# Patient Record
Sex: MALE | Race: BLACK OR AFRICAN AMERICAN | Employment: OTHER | URBAN - METROPOLITAN AREA
[De-identification: names, ages, dates, MRNs, and addresses within clinical notes are randomized per-mention and may not be internally consistent; named-entity substitution may affect disease eponyms.]

---

## 2018-07-02 ENCOUNTER — HOSPITAL ENCOUNTER (OUTPATIENT)
Dept: NUCLEAR MEDICINE | Age: 58
Discharge: HOME | End: 2018-07-04
Payer: COMMERCIAL

## 2018-07-02 DIAGNOSIS — E04.1 THYROID NODULE: ICD-10-CM

## 2018-07-02 PROCEDURE — A9512 TC99M PERTECHNETATE: HCPCS | Performed by: RADIOLOGY

## 2018-07-02 PROCEDURE — 78014 THYROID IMAGING W/BLOOD FLOW: CPT

## 2018-07-02 PROCEDURE — 3430000000 HC RX DIAGNOSTIC RADIOPHARMACEUTICAL: Performed by: RADIOLOGY

## 2018-07-02 RX ADMIN — Medication 20 MICRO CURIE: at 09:15

## 2018-07-03 PROCEDURE — 3430000000 HC RX DIAGNOSTIC RADIOPHARMACEUTICAL: Performed by: RADIOLOGY

## 2018-07-03 PROCEDURE — A9512 TC99M PERTECHNETATE: HCPCS | Performed by: RADIOLOGY

## 2018-07-03 RX ADMIN — Medication 8 MILLICURIE: at 08:28

## 2022-01-04 LAB — PROSTATE SPECIFIC ANTIGEN: 4.16 NG/ML (ref 0–4)

## 2022-03-22 ENCOUNTER — HOSPITAL ENCOUNTER (EMERGENCY)
Age: 62
Discharge: HOME | End: 2022-03-22
Attending: EMERGENCY MEDICINE
Payer: COMMERCIAL

## 2022-03-22 VITALS
HEART RATE: 66 BPM | WEIGHT: 145 LBS | HEIGHT: 66 IN | SYSTOLIC BLOOD PRESSURE: 130 MMHG | RESPIRATION RATE: 20 BRPM | OXYGEN SATURATION: 97 % | TEMPERATURE: 97.7 F | BODY MASS INDEX: 23.3 KG/M2 | DIASTOLIC BLOOD PRESSURE: 76 MMHG

## 2022-03-22 DIAGNOSIS — R33.9 URINARY RETENTION: Primary | ICD-10-CM

## 2022-03-22 DIAGNOSIS — E87.6 HYPOKALEMIA: ICD-10-CM

## 2022-03-22 LAB
ANION GAP SERPL CALCULATED.3IONS-SCNC: 14 MMOL/L (ref 7–16)
BACTERIA: ABNORMAL /HPF
BASOPHILS ABSOLUTE: 0.02 E9/L (ref 0–0.2)
BASOPHILS RELATIVE PERCENT: 0.3 % (ref 0–2)
BILIRUBIN URINE: NEGATIVE
BLOOD, URINE: ABNORMAL
BUN BLDV-MCNC: 10 MG/DL (ref 6–23)
CALCIUM SERPL-MCNC: 9.2 MG/DL (ref 8.6–10.2)
CHLORIDE BLD-SCNC: 102 MMOL/L (ref 98–107)
CLARITY: CLEAR
CO2: 22 MMOL/L (ref 22–29)
COLOR: YELLOW
CREAT SERPL-MCNC: 1 MG/DL (ref 0.7–1.2)
EOSINOPHILS ABSOLUTE: 0 E9/L (ref 0.05–0.5)
EOSINOPHILS RELATIVE PERCENT: 0 % (ref 0–6)
GFR AFRICAN AMERICAN: >60
GFR NON-AFRICAN AMERICAN: >60 ML/MIN/1.73
GLUCOSE BLD-MCNC: 154 MG/DL (ref 74–99)
GLUCOSE URINE: NEGATIVE MG/DL
HCT VFR BLD CALC: 42.6 % (ref 37–54)
HEMOGLOBIN: 15.2 G/DL (ref 12.5–16.5)
IMMATURE GRANULOCYTES #: 0.02 E9/L
IMMATURE GRANULOCYTES %: 0.3 % (ref 0–5)
KETONES, URINE: 15 MG/DL
LEUKOCYTE ESTERASE, URINE: NEGATIVE
LYMPHOCYTES ABSOLUTE: 0.57 E9/L (ref 1.5–4)
LYMPHOCYTES RELATIVE PERCENT: 8.6 % (ref 20–42)
MAGNESIUM: 1.8 MG/DL (ref 1.6–2.6)
MCH RBC QN AUTO: 30.1 PG (ref 26–35)
MCHC RBC AUTO-ENTMCNC: 35.7 % (ref 32–34.5)
MCV RBC AUTO: 84.4 FL (ref 80–99.9)
MONOCYTES ABSOLUTE: 0.19 E9/L (ref 0.1–0.95)
MONOCYTES RELATIVE PERCENT: 2.9 % (ref 2–12)
NEUTROPHILS ABSOLUTE: 5.79 E9/L (ref 1.8–7.3)
NEUTROPHILS RELATIVE PERCENT: 87.9 % (ref 43–80)
NITRITE, URINE: NEGATIVE
PDW BLD-RTO: 12.8 FL (ref 11.5–15)
PH UA: 7.5 (ref 5–9)
PLATELET # BLD: 270 E9/L (ref 130–450)
PMV BLD AUTO: 9.7 FL (ref 7–12)
POTASSIUM REFLEX MAGNESIUM: 3.4 MMOL/L (ref 3.5–5)
PROTEIN UA: NEGATIVE MG/DL
RBC # BLD: 5.05 E12/L (ref 3.8–5.8)
RBC # BLD: NORMAL 10*6/UL
RBC UA: ABNORMAL /HPF (ref 0–2)
SODIUM BLD-SCNC: 138 MMOL/L (ref 132–146)
SPECIFIC GRAVITY UA: 1.01 (ref 1–1.03)
UROBILINOGEN, URINE: 0.2 E.U./DL
WBC # BLD: 6.6 E9/L (ref 4.5–11.5)
WBC UA: ABNORMAL /HPF (ref 0–5)

## 2022-03-22 PROCEDURE — 80048 BASIC METABOLIC PNL TOTAL CA: CPT

## 2022-03-22 PROCEDURE — 36415 COLL VENOUS BLD VENIPUNCTURE: CPT

## 2022-03-22 PROCEDURE — 51702 INSERT TEMP BLADDER CATH: CPT

## 2022-03-22 PROCEDURE — 51798 US URINE CAPACITY MEASURE: CPT

## 2022-03-22 PROCEDURE — 81001 URINALYSIS AUTO W/SCOPE: CPT

## 2022-03-22 PROCEDURE — 83735 ASSAY OF MAGNESIUM: CPT

## 2022-03-22 PROCEDURE — 85025 COMPLETE CBC W/AUTO DIFF WBC: CPT

## 2022-03-22 PROCEDURE — 6370000000 HC RX 637 (ALT 250 FOR IP): Performed by: EMERGENCY MEDICINE

## 2022-03-22 PROCEDURE — 99284 EMERGENCY DEPT VISIT MOD MDM: CPT

## 2022-03-22 RX ORDER — ONDANSETRON 4 MG/1
4 TABLET, ORALLY DISINTEGRATING ORAL ONCE
Status: COMPLETED | OUTPATIENT
Start: 2022-03-22 | End: 2022-03-22

## 2022-03-22 RX ORDER — LEVOTHYROXINE SODIUM 112 UG/1
88 TABLET ORAL DAILY
COMMUNITY

## 2022-03-22 RX ORDER — POTASSIUM CHLORIDE 20 MEQ/1
40 TABLET, EXTENDED RELEASE ORAL ONCE
Status: COMPLETED | OUTPATIENT
Start: 2022-03-22 | End: 2022-03-22

## 2022-03-22 RX ADMIN — ONDANSETRON 4 MG: 4 TABLET, ORALLY DISINTEGRATING ORAL at 20:44

## 2022-03-22 RX ADMIN — POTASSIUM CHLORIDE 40 MEQ: 20 TABLET, EXTENDED RELEASE ORAL at 21:22

## 2022-03-22 ASSESSMENT — PAIN SCALES - GENERAL: PAINLEVEL_OUTOF10: 8

## 2022-03-22 ASSESSMENT — PAIN DESCRIPTION - PAIN TYPE: TYPE: ACUTE PAIN

## 2022-03-22 ASSESSMENT — PAIN DESCRIPTION - DESCRIPTORS: DESCRIPTORS: PRESSURE

## 2022-03-22 ASSESSMENT — PAIN DESCRIPTION - LOCATION: LOCATION: ABDOMEN

## 2022-03-22 NOTE — ED NOTES
1949  Patient unable to void, 16fg schwartz catheter placed without difficulty, 700ml yellow urine immediately returned.      Parag Kohli RN  03/1960

## 2022-03-23 NOTE — ED PROVIDER NOTES
7.0 - 12.0 fL    Neutrophils % 87.9 (H) 43.0 - 80.0 %    Immature Granulocytes % 0.3 0.0 - 5.0 %    Lymphocytes % 8.6 (L) 20.0 - 42.0 %    Monocytes % 2.9 2.0 - 12.0 %    Eosinophils % 0.0 0.0 - 6.0 %    Basophils % 0.3 0.0 - 2.0 %    Neutrophils Absolute 5.79 1.80 - 7.30 E9/L    Immature Granulocytes # 0.02 E9/L    Lymphocytes Absolute 0.57 (L) 1.50 - 4.00 E9/L    Monocytes Absolute 0.19 0.10 - 0.95 E9/L    Eosinophils Absolute 0.00 (L) 0.05 - 0.50 E9/L    Basophils Absolute 0.02 0.00 - 0.20 E9/L    RBC Morphology Normal    Basic Metabolic Panel w/ Reflex to MG   Result Value Ref Range    Sodium 138 132 - 146 mmol/L    Potassium reflex Magnesium 3.4 (L) 3.5 - 5.0 mmol/L    Chloride 102 98 - 107 mmol/L    CO2 22 22 - 29 mmol/L    Anion Gap 14 7 - 16 mmol/L    Glucose 154 (H) 74 - 99 mg/dL    BUN 10 6 - 23 mg/dL    CREATININE 1.0 0.7 - 1.2 mg/dL    GFR Non-African American >60 >=60 mL/min/1.73    GFR African American >60     Calcium 9.2 8.6 - 10.2 mg/dL   Urinalysis with Microscopic   Result Value Ref Range    Color, UA Yellow Straw/Yellow    Clarity, UA Clear Clear    Glucose, Ur Negative Negative mg/dL    Bilirubin Urine Negative Negative    Ketones, Urine 15 (A) Negative mg/dL    Specific Gravity, UA 1.010 1.005 - 1.030    Blood, Urine SMALL (A) Negative    pH, UA 7.5 5.0 - 9.0    Protein, UA Negative Negative mg/dL    Urobilinogen, Urine 0.2 <2.0 E.U./dL    Nitrite, Urine Negative Negative    Leukocyte Esterase, Urine Negative Negative    WBC, UA NONE 0 - 5 /HPF    RBC, UA 1-3 0 - 2 /HPF    Bacteria, UA NONE SEEN None Seen /HPF   Magnesium   Result Value Ref Range    Magnesium 1.8 1.6 - 2.6 mg/dL       RADIOLOGY:  Interpreted by Radiologist.  No orders to display       ------------------------- NURSING NOTES AND VITALS REVIEWED ---------------------------   The nursing notes within the ED encounter and vital signs as below have been reviewed.    BP (!) 157/84   Pulse 64   Temp 97.7 °F (36.5 °C) (Oral)   Resp 22  5' 6\" (1.676 m)   Wt 145 lb (65.8 kg)   SpO2 96%   BMI 23.40 kg/m²   Oxygen Saturation Interpretation: Normal      ---------------------------------------------------PHYSICAL EXAM--------------------------------------      Constitutional/General: Alert and oriented x3, well appearing, non toxic in NAD  Head: Normocephalic and atraumatic  Eyes: EOMI  Mouth: Oropharynx clear, handling secretions, no trismus  Neck: Supple, full ROM,   Pulmonary: Lungs clear to auscultation bilaterally, no wheezes, rales, or rhonchi. Not in respiratory distress  Cardiovascular:  Regular rate and rhythm, no murmurs, gallops, or rubs. 2+ distal pulses  Abdomen: Soft, non tender, non distended,   Extremities: Moves all extremities x 4. Warm and well perfused  Skin: warm and dry without rash  Neurologic: GCS 15, no focal motor or sensory deficits   Psych: Normal Affect. Behavior normal.      ------------------------------ ED COURSE/MEDICAL DECISION MAKING----------------------  Medications   ondansetron (ZOFRAN-ODT) disintegrating tablet 4 mg (4 mg Oral Given 3/22/22 2044)   potassium chloride (KLOR-CON M) extended release tablet 40 mEq (40 mEq Oral Given 3/22/22 2122)       Medical Decision Making/ED COURSE:   Patient is a 73-year-old male presenting with urinary retention after prostate biopsy today. Patient was unable to urinate in the ED to obtain a post void residual.  Nursing placed a Fung catheter with 700 cc of clear yellow urine immediately drained. Labs obtained. Patient administered zofran. I reviewed and interpreted labs. Labs showed mild hypokalemia which was replaced orally but were otherwise reassuring. Urinalysis did not appear infected. Patient is already on Flomax, and I advised that he continue this medication. Patient remained hemodynamically stable throughout ED course. ED Course as of 03/22/22 2229   Tue Mar 22, 2022   2121 Urology was consulted.  I spoke with Dr. Cheryl Bo who agrees with plan for schwartz and outpatient follow up. [JA]      ED Course User Index  [JA] Ki Her MD       New Prescriptions    No medications on file     Ki Her MD    Counseling: The emergency provider has spoken with the patient and discussed todays results, in addition to providing specific details for the plan of care and counseling regarding the diagnosis and prognosis. Questions are answered at this time and they are agreeable with the plan.      --------------------------------- IMPRESSION AND DISPOSITION ---------------------------------    IMPRESSION  1. Urinary retention    2. Hypokalemia        DISPOSITION  Disposition: Discharge to home  Patient condition is stable      NOTE: This report was transcribed using voice recognition software.  Every effort was made to ensure accuracy; however, inadvertent computerized transcription errors may be present    IKi MD, am the primary provider of this record       Ki Her MD  03/22/22 3956

## 2022-03-23 NOTE — ED NOTES
Attempted to draw labs, patient advised he didn't want labs drawn at this time as he only came in for catheter & will have labs done at his doctors. Dr Abad Hightower notified.

## 2022-05-02 ENCOUNTER — HOSPITAL ENCOUNTER (OUTPATIENT)
Age: 62
Discharge: HOME | End: 2022-05-04

## 2022-05-02 PROCEDURE — 88305 TISSUE EXAM BY PATHOLOGIST: CPT

## 2022-05-03 ENCOUNTER — HOSPITAL ENCOUNTER (OUTPATIENT)
Age: 62
Discharge: HOME | End: 2022-05-05

## 2022-05-03 LAB
ANION GAP SERPL CALCULATED.3IONS-SCNC: 11 MMOL/L (ref 7–16)
BUN BLDV-MCNC: 10 MG/DL (ref 6–23)
CALCIUM SERPL-MCNC: 9.5 MG/DL (ref 8.6–10.2)
CHLORIDE BLD-SCNC: 101 MMOL/L (ref 98–107)
CO2: 27 MMOL/L (ref 22–29)
CREAT SERPL-MCNC: 0.9 MG/DL (ref 0.7–1.2)
GFR AFRICAN AMERICAN: >60
GFR NON-AFRICAN AMERICAN: >60 ML/MIN/1.73
GLUCOSE BLD-MCNC: 114 MG/DL (ref 74–99)
HCT VFR BLD CALC: 37.9 % (ref 37–54)
HEMOGLOBIN: 13.3 G/DL (ref 12.5–16.5)
MAGNESIUM: 1.8 MG/DL (ref 1.6–2.6)
POTASSIUM SERPL-SCNC: 3.1 MMOL/L (ref 3.5–5)
SODIUM BLD-SCNC: 139 MMOL/L (ref 132–146)

## 2022-05-03 PROCEDURE — 80048 BASIC METABOLIC PNL TOTAL CA: CPT

## 2022-05-03 PROCEDURE — 83735 ASSAY OF MAGNESIUM: CPT

## 2022-05-03 PROCEDURE — 85014 HEMATOCRIT: CPT

## 2022-05-03 PROCEDURE — 85018 HEMOGLOBIN: CPT

## 2022-05-09 ENCOUNTER — HOSPITAL ENCOUNTER (INPATIENT)
Age: 62
LOS: 3 days | Discharge: HOME | DRG: 064 | End: 2022-05-13
Attending: EMERGENCY MEDICINE | Admitting: FAMILY MEDICINE
Payer: COMMERCIAL

## 2022-05-09 ENCOUNTER — APPOINTMENT (OUTPATIENT)
Dept: CT IMAGING | Age: 62
DRG: 064 | End: 2022-05-09
Payer: COMMERCIAL

## 2022-05-09 ENCOUNTER — APPOINTMENT (OUTPATIENT)
Dept: GENERAL RADIOLOGY | Age: 62
DRG: 064 | End: 2022-05-09
Payer: COMMERCIAL

## 2022-05-09 DIAGNOSIS — G45.9 TIA (TRANSIENT ISCHEMIC ATTACK): ICD-10-CM

## 2022-05-09 DIAGNOSIS — R29.90 STROKE-LIKE SYMPTOMS: ICD-10-CM

## 2022-05-09 DIAGNOSIS — R47.01 RECEPTIVE APHASIA: Primary | ICD-10-CM

## 2022-05-09 DIAGNOSIS — R53.1 RIGHT SIDED WEAKNESS: ICD-10-CM

## 2022-05-09 DIAGNOSIS — I63.9 ACUTE ISCHEMIC STROKE (HCC): ICD-10-CM

## 2022-05-09 LAB
ALBUMIN SERPL-MCNC: 4.2 G/DL (ref 3.5–5.2)
ALP BLD-CCNC: 94 U/L (ref 40–129)
ALT SERPL-CCNC: 8 U/L (ref 0–40)
ANION GAP SERPL CALCULATED.3IONS-SCNC: 16 MMOL/L (ref 7–16)
APTT: 33.7 SEC (ref 24.5–35.1)
AST SERPL-CCNC: 14 U/L (ref 0–39)
BACTERIA: ABNORMAL /HPF
BASOPHILS ABSOLUTE: 0.04 E9/L (ref 0–0.2)
BASOPHILS RELATIVE PERCENT: 0.4 % (ref 0–2)
BILIRUB SERPL-MCNC: 0.4 MG/DL (ref 0–1.2)
BILIRUBIN URINE: NEGATIVE
BLOOD, URINE: ABNORMAL
BUN BLDV-MCNC: 14 MG/DL (ref 6–23)
CALCIUM SERPL-MCNC: 9.5 MG/DL (ref 8.6–10.2)
CHLORIDE BLD-SCNC: 99 MMOL/L (ref 98–107)
CLARITY: CLEAR
CO2: 23 MMOL/L (ref 22–29)
COLOR: YELLOW
CREAT SERPL-MCNC: 1.4 MG/DL (ref 0.7–1.2)
EOSINOPHILS ABSOLUTE: 0.08 E9/L (ref 0.05–0.5)
EOSINOPHILS RELATIVE PERCENT: 0.9 % (ref 0–6)
GFR AFRICAN AMERICAN: >60
GFR NON-AFRICAN AMERICAN: >60 ML/MIN/1.73
GLUCOSE BLD-MCNC: 168 MG/DL (ref 74–99)
GLUCOSE URINE: NEGATIVE MG/DL
HCT VFR BLD CALC: 42 % (ref 37–54)
HEMOGLOBIN: 14.9 G/DL (ref 12.5–16.5)
IMMATURE GRANULOCYTES #: 0.03 E9/L
IMMATURE GRANULOCYTES %: 0.3 % (ref 0–5)
INR BLD: 1.1
KETONES, URINE: ABNORMAL MG/DL
LACTIC ACID: 1.8 MMOL/L (ref 0.5–2.2)
LEUKOCYTE ESTERASE, URINE: NEGATIVE
LYMPHOCYTES ABSOLUTE: 1.93 E9/L (ref 1.5–4)
LYMPHOCYTES RELATIVE PERCENT: 21.1 % (ref 20–42)
MAGNESIUM: 2.1 MG/DL (ref 1.6–2.6)
MCH RBC QN AUTO: 29.5 PG (ref 26–35)
MCHC RBC AUTO-ENTMCNC: 35.5 % (ref 32–34.5)
MCV RBC AUTO: 83.2 FL (ref 80–99.9)
METER GLUCOSE: 159 MG/DL (ref 74–99)
MONOCYTES ABSOLUTE: 0.64 E9/L (ref 0.1–0.95)
MONOCYTES RELATIVE PERCENT: 7 % (ref 2–12)
NEUTROPHILS ABSOLUTE: 6.42 E9/L (ref 1.8–7.3)
NEUTROPHILS RELATIVE PERCENT: 70.3 % (ref 43–80)
NITRITE, URINE: NEGATIVE
PDW BLD-RTO: 12.7 FL (ref 11.5–15)
PH UA: 6 (ref 5–9)
PLATELET # BLD: 362 E9/L (ref 130–450)
PMV BLD AUTO: 9.1 FL (ref 7–12)
POTASSIUM REFLEX MAGNESIUM: 3.4 MMOL/L (ref 3.5–5)
PRO-BNP: 29 PG/ML (ref 0–125)
PROTEIN UA: NEGATIVE MG/DL
PROTHROMBIN TIME: 12.6 SEC (ref 9.3–12.4)
RBC # BLD: 5.05 E12/L (ref 3.8–5.8)
RBC UA: >20 /HPF (ref 0–2)
SODIUM BLD-SCNC: 138 MMOL/L (ref 132–146)
SPECIFIC GRAVITY UA: <=1.005 (ref 1–1.03)
TOTAL PROTEIN: 7.9 G/DL (ref 6.4–8.3)
TROPONIN, HIGH SENSITIVITY: 14 NG/L (ref 0–11)
UROBILINOGEN, URINE: 0.2 E.U./DL
WBC # BLD: 9.1 E9/L (ref 4.5–11.5)
WBC UA: ABNORMAL /HPF (ref 0–5)

## 2022-05-09 PROCEDURE — 99448 NTRPROF PH1/NTRNET/EHR 21-30: CPT | Performed by: PSYCHIATRY & NEUROLOGY

## 2022-05-09 PROCEDURE — 80143 DRUG ASSAY ACETAMINOPHEN: CPT

## 2022-05-09 PROCEDURE — 82962 GLUCOSE BLOOD TEST: CPT

## 2022-05-09 PROCEDURE — 83605 ASSAY OF LACTIC ACID: CPT

## 2022-05-09 PROCEDURE — 84484 ASSAY OF TROPONIN QUANT: CPT

## 2022-05-09 PROCEDURE — 82077 ASSAY SPEC XCP UR&BREATH IA: CPT

## 2022-05-09 PROCEDURE — 99285 EMERGENCY DEPT VISIT HI MDM: CPT

## 2022-05-09 PROCEDURE — 80307 DRUG TEST PRSMV CHEM ANLYZR: CPT

## 2022-05-09 PROCEDURE — 80179 DRUG ASSAY SALICYLATE: CPT

## 2022-05-09 PROCEDURE — 81001 URINALYSIS AUTO W/SCOPE: CPT

## 2022-05-09 PROCEDURE — 80053 COMPREHEN METABOLIC PANEL: CPT

## 2022-05-09 PROCEDURE — 6360000004 HC RX CONTRAST MEDICATION: Performed by: RADIOLOGY

## 2022-05-09 PROCEDURE — 83880 ASSAY OF NATRIURETIC PEPTIDE: CPT

## 2022-05-09 PROCEDURE — 71045 X-RAY EXAM CHEST 1 VIEW: CPT

## 2022-05-09 PROCEDURE — 70496 CT ANGIOGRAPHY HEAD: CPT

## 2022-05-09 PROCEDURE — 85025 COMPLETE CBC W/AUTO DIFF WBC: CPT

## 2022-05-09 PROCEDURE — 6370000000 HC RX 637 (ALT 250 FOR IP): Performed by: EMERGENCY MEDICINE

## 2022-05-09 PROCEDURE — 6360000002 HC RX W HCPCS: Performed by: EMERGENCY MEDICINE

## 2022-05-09 PROCEDURE — 87088 URINE BACTERIA CULTURE: CPT

## 2022-05-09 PROCEDURE — 6370000000 HC RX 637 (ALT 250 FOR IP)

## 2022-05-09 PROCEDURE — 4A03X5D MEASUREMENT OF ARTERIAL FLOW, INTRACRANIAL, EXTERNAL APPROACH: ICD-10-PCS | Performed by: PSYCHIATRY & NEUROLOGY

## 2022-05-09 PROCEDURE — 93005 ELECTROCARDIOGRAM TRACING: CPT | Performed by: EMERGENCY MEDICINE

## 2022-05-09 PROCEDURE — 70498 CT ANGIOGRAPHY NECK: CPT

## 2022-05-09 PROCEDURE — 96374 THER/PROPH/DIAG INJ IV PUSH: CPT

## 2022-05-09 PROCEDURE — 83735 ASSAY OF MAGNESIUM: CPT

## 2022-05-09 PROCEDURE — 36415 COLL VENOUS BLD VENIPUNCTURE: CPT

## 2022-05-09 PROCEDURE — 85610 PROTHROMBIN TIME: CPT

## 2022-05-09 PROCEDURE — 85730 THROMBOPLASTIN TIME PARTIAL: CPT

## 2022-05-09 RX ORDER — ASPIRIN 325 MG
TABLET ORAL
Status: DISCONTINUED
Start: 2022-05-09 | End: 2022-05-10 | Stop reason: HOSPADM

## 2022-05-09 RX ORDER — ASPIRIN 300 MG/1
300 SUPPOSITORY RECTAL ONCE
Status: COMPLETED | OUTPATIENT
Start: 2022-05-09 | End: 2022-05-09

## 2022-05-09 RX ORDER — ONDANSETRON 2 MG/ML
4 INJECTION INTRAMUSCULAR; INTRAVENOUS ONCE
Status: COMPLETED | OUTPATIENT
Start: 2022-05-09 | End: 2022-05-09

## 2022-05-09 RX ORDER — OXYCODONE HYDROCHLORIDE AND ACETAMINOPHEN 5; 325 MG/1; MG/1
1 TABLET ORAL EVERY 4 HOURS PRN
COMMUNITY
End: 2022-06-16

## 2022-05-09 RX ORDER — ASPIRIN 81 MG/1
324 TABLET, CHEWABLE ORAL ONCE
Status: DISCONTINUED | OUTPATIENT
Start: 2022-05-09 | End: 2022-05-09

## 2022-05-09 RX ADMIN — ASPIRIN 300 MG: 300 SUPPOSITORY RECTAL at 23:24

## 2022-05-09 RX ADMIN — ONDANSETRON HYDROCHLORIDE 4 MG: 2 SOLUTION INTRAMUSCULAR; INTRAVENOUS at 23:25

## 2022-05-09 RX ADMIN — IOPAMIDOL 75 ML: 755 INJECTION, SOLUTION INTRAVENOUS at 22:38

## 2022-05-10 ENCOUNTER — APPOINTMENT (OUTPATIENT)
Dept: CT IMAGING | Age: 62
DRG: 064 | End: 2022-05-10
Payer: COMMERCIAL

## 2022-05-10 ENCOUNTER — APPOINTMENT (OUTPATIENT)
Dept: MRI IMAGING | Age: 62
DRG: 064 | End: 2022-05-10
Payer: COMMERCIAL

## 2022-05-10 PROBLEM — R29.90 STROKE-LIKE SYMPTOMS: Status: ACTIVE | Noted: 2022-05-10

## 2022-05-10 PROBLEM — E03.9 HYPOTHYROIDISM: Status: ACTIVE | Noted: 2022-05-10

## 2022-05-10 PROBLEM — I10 HTN (HYPERTENSION): Status: ACTIVE | Noted: 2022-05-10

## 2022-05-10 LAB
ACETAMINOPHEN LEVEL: <5 MCG/ML (ref 10–30)
AMPHETAMINE SCREEN, URINE: NOT DETECTED
ANION GAP SERPL CALCULATED.3IONS-SCNC: 9 MMOL/L (ref 7–16)
BARBITURATE SCREEN URINE: NOT DETECTED
BENZODIAZEPINE SCREEN, URINE: NOT DETECTED
BUN BLDV-MCNC: 11 MG/DL (ref 6–23)
CALCIUM SERPL-MCNC: 9.4 MG/DL (ref 8.6–10.2)
CANNABINOID SCREEN URINE: NOT DETECTED
CHLORIDE BLD-SCNC: 105 MMOL/L (ref 98–107)
CHOLESTEROL, TOTAL: 172 MG/DL (ref 0–199)
CO2: 31 MMOL/L (ref 22–29)
COCAINE METABOLITE SCREEN URINE: NOT DETECTED
CREAT SERPL-MCNC: 1.1 MG/DL (ref 0.7–1.2)
EKG ATRIAL RATE: 67 BPM
EKG P AXIS: 67 DEGREES
EKG P-R INTERVAL: 212 MS
EKG Q-T INTERVAL: 422 MS
EKG QRS DURATION: 88 MS
EKG QTC CALCULATION (BAZETT): 445 MS
EKG R AXIS: 54 DEGREES
EKG T AXIS: 87 DEGREES
EKG VENTRICULAR RATE: 67 BPM
ETHANOL: <10 MG/DL (ref 0–0.08)
FENTANYL SCREEN, URINE: NOT DETECTED
GFR AFRICAN AMERICAN: >60
GFR NON-AFRICAN AMERICAN: >60 ML/MIN/1.73
GLUCOSE BLD-MCNC: 130 MG/DL (ref 74–99)
HBA1C MFR BLD: 5.8 % (ref 4–5.6)
HCT VFR BLD CALC: 41.3 % (ref 37–54)
HDLC SERPL-MCNC: 60 MG/DL
HEMOGLOBIN: 14.1 G/DL (ref 12.5–16.5)
LDL CHOLESTEROL CALCULATED: 105 MG/DL (ref 0–99)
Lab: ABNORMAL
MCH RBC QN AUTO: 29.4 PG (ref 26–35)
MCHC RBC AUTO-ENTMCNC: 34.1 % (ref 32–34.5)
MCV RBC AUTO: 86 FL (ref 80–99.9)
METHADONE SCREEN, URINE: NOT DETECTED
OPIATE SCREEN URINE: NOT DETECTED
OXYCODONE URINE: POSITIVE
PDW BLD-RTO: 12.7 FL (ref 11.5–15)
PHENCYCLIDINE SCREEN URINE: NOT DETECTED
PLATELET # BLD: 356 E9/L (ref 130–450)
PMV BLD AUTO: 9.1 FL (ref 7–12)
POTASSIUM REFLEX MAGNESIUM: 3.6 MMOL/L (ref 3.5–5)
RBC # BLD: 4.8 E12/L (ref 3.8–5.8)
SALICYLATE, SERUM: <0.3 MG/DL (ref 0–30)
SODIUM BLD-SCNC: 145 MMOL/L (ref 132–146)
TRICYCLIC ANTIDEPRESSANTS SCREEN SERUM: NEGATIVE NG/ML
TRIGL SERPL-MCNC: 34 MG/DL (ref 0–149)
TSH SERPL DL<=0.05 MIU/L-ACNC: 2.87 UIU/ML (ref 0.27–4.2)
VLDLC SERPL CALC-MCNC: 7 MG/DL
WBC # BLD: 6.9 E9/L (ref 4.5–11.5)

## 2022-05-10 PROCEDURE — 6360000004 HC RX CONTRAST MEDICATION: Performed by: RADIOLOGY

## 2022-05-10 PROCEDURE — 99449 NTRPROF PH1/NTRNET/EHR 31/>: CPT | Performed by: PSYCHIATRY & NEUROLOGY

## 2022-05-10 PROCEDURE — 2580000003 HC RX 258: Performed by: GENERAL PRACTICE

## 2022-05-10 PROCEDURE — 6370000000 HC RX 637 (ALT 250 FOR IP): Performed by: FAMILY MEDICINE

## 2022-05-10 PROCEDURE — 70551 MRI BRAIN STEM W/O DYE: CPT

## 2022-05-10 PROCEDURE — 83036 HEMOGLOBIN GLYCOSYLATED A1C: CPT

## 2022-05-10 PROCEDURE — 2060000000 HC ICU INTERMEDIATE R&B

## 2022-05-10 PROCEDURE — 36415 COLL VENOUS BLD VENIPUNCTURE: CPT

## 2022-05-10 PROCEDURE — 97165 OT EVAL LOW COMPLEX 30 MIN: CPT

## 2022-05-10 PROCEDURE — 80048 BASIC METABOLIC PNL TOTAL CA: CPT

## 2022-05-10 PROCEDURE — 97530 THERAPEUTIC ACTIVITIES: CPT

## 2022-05-10 PROCEDURE — 92523 SPEECH SOUND LANG COMPREHEN: CPT | Performed by: SPEECH-LANGUAGE PATHOLOGIST

## 2022-05-10 PROCEDURE — 80061 LIPID PANEL: CPT

## 2022-05-10 PROCEDURE — 97535 SELF CARE MNGMENT TRAINING: CPT

## 2022-05-10 PROCEDURE — 84443 ASSAY THYROID STIM HORMONE: CPT

## 2022-05-10 PROCEDURE — 6360000002 HC RX W HCPCS: Performed by: FAMILY MEDICINE

## 2022-05-10 PROCEDURE — 92610 EVALUATE SWALLOWING FUNCTION: CPT | Performed by: SPEECH-LANGUAGE PATHOLOGIST

## 2022-05-10 PROCEDURE — 92526 ORAL FUNCTION THERAPY: CPT | Performed by: SPEECH-LANGUAGE PATHOLOGIST

## 2022-05-10 PROCEDURE — 0042T CT BRAIN PERFUSION: CPT

## 2022-05-10 PROCEDURE — 92507 TX SP LANG VOICE COMM INDIV: CPT | Performed by: SPEECH-LANGUAGE PATHOLOGIST

## 2022-05-10 PROCEDURE — 85027 COMPLETE CBC AUTOMATED: CPT

## 2022-05-10 PROCEDURE — 93010 ELECTROCARDIOGRAM REPORT: CPT | Performed by: INTERNAL MEDICINE

## 2022-05-10 PROCEDURE — 97161 PT EVAL LOW COMPLEX 20 MIN: CPT

## 2022-05-10 RX ORDER — SODIUM CHLORIDE 9 MG/ML
1000 INJECTION, SOLUTION INTRAVENOUS CONTINUOUS
Status: ACTIVE | OUTPATIENT
Start: 2022-05-10 | End: 2022-05-10

## 2022-05-10 RX ORDER — ENOXAPARIN SODIUM 100 MG/ML
40 INJECTION SUBCUTANEOUS DAILY
Status: DISCONTINUED | OUTPATIENT
Start: 2022-05-10 | End: 2022-05-13 | Stop reason: HOSPADM

## 2022-05-10 RX ORDER — AMLODIPINE BESYLATE 10 MG/1
10 TABLET ORAL DAILY
Status: DISCONTINUED | OUTPATIENT
Start: 2022-05-10 | End: 2022-05-13 | Stop reason: HOSPADM

## 2022-05-10 RX ORDER — ONDANSETRON 4 MG/1
4 TABLET, ORALLY DISINTEGRATING ORAL EVERY 8 HOURS PRN
Status: DISCONTINUED | OUTPATIENT
Start: 2022-05-10 | End: 2022-05-13 | Stop reason: HOSPADM

## 2022-05-10 RX ORDER — LEVOTHYROXINE SODIUM 88 UG/1
88 TABLET ORAL DAILY
Status: DISCONTINUED | OUTPATIENT
Start: 2022-05-10 | End: 2022-05-13 | Stop reason: HOSPADM

## 2022-05-10 RX ORDER — LISINOPRIL 10 MG/1
10 TABLET ORAL DAILY
Status: DISCONTINUED | OUTPATIENT
Start: 2022-05-10 | End: 2022-05-11

## 2022-05-10 RX ORDER — ATORVASTATIN CALCIUM 40 MG/1
40 TABLET, FILM COATED ORAL NIGHTLY
Status: DISCONTINUED | OUTPATIENT
Start: 2022-05-10 | End: 2022-05-13 | Stop reason: HOSPADM

## 2022-05-10 RX ORDER — ONDANSETRON 2 MG/ML
4 INJECTION INTRAMUSCULAR; INTRAVENOUS EVERY 6 HOURS PRN
Status: DISCONTINUED | OUTPATIENT
Start: 2022-05-10 | End: 2022-05-13 | Stop reason: HOSPADM

## 2022-05-10 RX ORDER — POLYETHYLENE GLYCOL 3350 17 G/17G
17 POWDER, FOR SOLUTION ORAL DAILY PRN
Status: DISCONTINUED | OUTPATIENT
Start: 2022-05-10 | End: 2022-05-13 | Stop reason: HOSPADM

## 2022-05-10 RX ORDER — ASPIRIN 325 MG
325 TABLET ORAL
Status: COMPLETED | OUTPATIENT
Start: 2022-05-10 | End: 2022-05-10

## 2022-05-10 RX ORDER — POTASSIUM CHLORIDE 750 MG/1
CAPSULE, EXTENDED RELEASE ORAL 2 TIMES DAILY
COMMUNITY

## 2022-05-10 RX ORDER — ASPIRIN 81 MG/1
81 TABLET, CHEWABLE ORAL DAILY
Status: DISCONTINUED | OUTPATIENT
Start: 2022-05-10 | End: 2022-05-13 | Stop reason: HOSPADM

## 2022-05-10 RX ADMIN — ASPIRIN 81 MG 81 MG: 81 TABLET ORAL at 08:46

## 2022-05-10 RX ADMIN — LEVOTHYROXINE SODIUM 88 MCG: 0.09 TABLET ORAL at 07:22

## 2022-05-10 RX ADMIN — SODIUM CHLORIDE 1000 ML: 9 INJECTION, SOLUTION INTRAVENOUS at 02:01

## 2022-05-10 RX ADMIN — IOPAMIDOL 60 ML: 755 INJECTION, SOLUTION INTRAVENOUS at 00:40

## 2022-05-10 RX ADMIN — AMLODIPINE BESYLATE 10 MG: 10 TABLET ORAL at 08:46

## 2022-05-10 RX ADMIN — ATORVASTATIN CALCIUM 40 MG: 40 TABLET, FILM COATED ORAL at 22:15

## 2022-05-10 RX ADMIN — ASPIRIN 325 MG: 325 TABLET ORAL at 05:13

## 2022-05-10 RX ADMIN — ENOXAPARIN SODIUM 40 MG: 100 INJECTION SUBCUTANEOUS at 08:44

## 2022-05-10 RX ADMIN — LISINOPRIL 10 MG: 10 TABLET ORAL at 08:45

## 2022-05-10 RX ADMIN — TICAGRELOR 180 MG: 90 TABLET ORAL at 05:13

## 2022-05-10 NOTE — ED NOTES
ETA for transport by PAS is 2-3 hours.   Access Line will set up transport by STAT 200 [facility], RN  05/09/22 100 [facility] Jillian, RN  05/09/22 8022

## 2022-05-10 NOTE — ED NOTES
Patient report to impatient floor, patient placed in transport.  Patients wife allowed to go with patient upstairs per patients nurse      Sarah Flores RN  05/10/22 8670

## 2022-05-10 NOTE — PROGRESS NOTES
SPEECH/LANGUAGE PATHOLOGY  CLINICAL ASSESSMENT OF SWALLOWING FUNCTION   and PLAN OF CARE  PATIENT NAME:  Alis Matthews  (male)     MRN:  [id number]    :  1960  (59 y.o.)  STATUS:  Inpatient: Room 24/4524-A    TODAY'S DATE:  5/10/2022  05/10/22 0545   Speech Language Pathology (SLP) eval and treat Start: 05/10/22 0545, End: 05/10/22 0545, ONE TIME, Standing Count: 1 Occurrences, R    Yeni Cordova DO  REASON FOR REFERRAL: stroke like symptoms   EVALUATING THERAPIST: ALFREDO Trevino                 ASSESSMENT:    DYSPHAGIA DIAGNOSIS:   Clinical indicators of normal swallow function      DIET RECOMMENDATIONS:  Regular consistency solids (IDDSI level 7) with  thin liquids (IDDSI level 0)     FEEDING RECOMMENDATIONS:     Assistance level:  No assistance needed      Compensatory strategies recommended: No strategies are recommended at this time      Discussed recommendations with nursing?: No secondary to no diet/liquid change recommended     SPEECH THERAPY  PLAN OF CARE   The dysphagia POC is established based on physician order, dysphagia diagnosis and results of clinical assessment     Dysphagia therapy is not recommended     Conditions Requiring Skilled Therapeutic Intervention for dysphagia:    not applicable    Specific dysphagia interventions to include:     Not applicable    Specific instructions for next treatment:  not applicable   Patient Treatment Goals:    Short Term Goals:  Not applicable no therapy warranted     Long Term Goals:   Not applicable no therapy warranted      Patient/family Goal:    not applicable                    ADMITTING DIAGNOSIS: Receptive aphasia [R47.01]  Right sided weakness [R53.1]  Stroke-like symptoms [R29.90]    VISIT DIAGNOSIS:   Visit Diagnoses       Codes    Receptive aphasia    -  Primary R47.01    Right sided weakness     R53.1           PATIENT REPORT/COMPLAINT: denies difficulty swallowing  RN cleared patient for participation in assessment     yes     PRIOR LEVEL OF SWALLOW FUNCTION:    PAST HISTORY OF DYSPHAGIA?: none reported    Home diet: Regular consistency solids (IDDSI level 7) with  thin liquids (IDDSI level 0)    Current Diet Order:  Diet NPO    PROCEDURE:  Consistencies Administered During the Evaluation   Liquids: thin liquid   Solids:  pureed foods and soft solid foods      Method of Intake:   cup, straw, spoon  Self fed      Position:   Seated, upright    CLINICAL ASSESSMENT  Oral Stage: The oral stage of swallowing was within functional limits      Pharyngeal Stage:    No signs of aspiration were noted during this evaluation however, silent aspiration cannot be ruled out at bedside. If silent aspiration is suspected, a Videofluoroscopic Study of Swallowing (MBS) is recommended and requires a physician order. Cognition:   Within functional limits for this exam    Oral Peripheral Examination   Adequate lingual/labial strength     Current Respiratory Status    room air     Parameters of Speech Production  Respiration:  Adequate for speech production  Quality:   Within functional limits  Intensity: Within functional limits    Volitional Swallow: Present    Volitional Cough:    Present    Pain: No pain reported. EDUCATION:   The Speech Language Pathologist (SLP) completed education regarding results of evaluation and that intervention is not warranted at this time. Learner: Patient, Significant Other and Family  Education:  Reviewed results and recommendations of this evaluation, Reviewed diet and strategies and Reviewed signs, symptoms and risks of aspiration  Evaluation of Education: Verbalizes understanding    This plan will be re-evaluated and revised in 1 week  if warranted. CPT code:  11492  bedside swallow eval    INTERVENTION  CPT Code: 28852  dysphagia tx    Speech Pathologist (SLP) completed education with the patient/family regarding type of swallowing impairment.  Reviewed current solid/liquid consistency diet recommendations and discussed compensatory strategies to ensure safe PO intake. Reviewed aspiration precautions. Encouraged patient and/or family to engage SLP in unstructured Q&A session relative to identified deficit areas; indicated understanding of all information provided via satisfactory verbal response. [x]The admitting diagnosis and active problem list, have been reviewed prior to initiation of this evaluation.         ACTIVE PROBLEM LIST:   Patient Active Problem List   Diagnosis    Stroke-like symptoms    HTN (hypertension)    Hypothyroidism           Lyndsey Chavira., CCC-SLP  Speech-Language Pathologist  [id number]  5/10/2022

## 2022-05-10 NOTE — ED NOTES
Radiology Procedure Waiver   Name: Alannah Hicks  : 1960  MRN: [id number]    Date:  22    Time: 10:15 PM EDT    Benefits of immediately proceeding with radiology exam(s) without pre-testing outweigh the risks or are not indicated as specified below and therefore the following is/are being waived:    [x] Benefits of immediate radiology exam(s) outweigh any risk. OR    Pre-exam testing is not indicated for the following reason(s):  [] Pregnancy test   [] Patients LMP on-time and regular.   [] Patient had Tubal Ligation or has other Contraception Device. [] Patient  is Menopausal or Premenarcheal.    [] Patient had Full or Partial Hysterectomy. [] Protocol for CT contrast allegry   [] Patient has tolerated well previously   [] Patient does not have a true allergy    [] MRI Questionnaire     [] BUN/Creatinine   [] Patient age w/no hx of renal dysfunction. [] Patient on Dialysis. [] Recent Normal Labs.   Electronically signed by Andres Hicks DO on 22 at 10:15 PM EDT               Andres Hicks DO  Resident  22 [phone]

## 2022-05-10 NOTE — ED NOTES
Patient failed swallow evaluation, po medication not given as ordered. Dr. Una Barksdale aware.       Ricarda Aguilar, RN  05/10/22 1756

## 2022-05-10 NOTE — PROGRESS NOTES
Physical Therapy    Physical Therapy Initial Assessment     Name: Chen Correia  : 1960  MRN: [id number]      Date of Service: 5/10/2022    Evaluating PT:  Phyllis Palomo PT, DPT  [id number]     Room #:  8196/3860-S  Diagnosis:  Receptive aphasia [R47.01]  Right sided weakness [R53.1]  Stroke-like symptoms [R29.90]  PMHx/PSHx:  HTN, thyroid disease   Procedure/Surgery:  NA  Precautions:  Falls, Aphasia, Cognition, RUE weakness, R visual field deficit   Equipment Needs:  TBD    SUBJECTIVE:    Pt lives with his wife in a 2 story home with 3 stairs and 1 rail to enter. Bedroom and bathroom are on the 2nd level with full flight and 1 rail. Pt ambulated with no AD PTA. OBJECTIVE:   Initial Evaluation  Date: 5/10/22 Treatment Short Term/ Long Term   Goals   AM-PAC 6 Clicks 16/43     Was pt agreeable to Eval/treatment? Yes      Does pt have pain? No c/o pain      Bed Mobility  Rolling: SBA  Supine to sit: SBA  Sit to supine: NT  Scooting: SBA  Rolling: Independent   Supine to sit: Independent   Sit to supine: Independent   Scooting: Independent    Transfers Sit to stand: CGA  Stand to sit: CGA  Stand pivot: CGA  Sit to stand: Independent   Stand to sit:  Independent   Stand pivot: Independent    Ambulation    350 feet with no AD CGA  >500 feet with no AD Independent    Stair negotiation: ascended and descended  12 steps with 1 rail Min A  >16 steps with 1 rail Modified Independent     ROM BUE:  Per OT eval   BLE:  WFL     Strength BUE:  Per OT eval   BLE:  5/5     Balance Sitting EOB:  SBA  Dynamic Standing:  CGA<>Min A  Sitting EOB:  Independent   Dynamic Standing:  Modified Independent       Pt is A & O x 4  RASS:  0  CAM-ICU:  NT  Sensation:  Pt denies numbness and tingling to extremities  Edema:  Unremarkable  Coordination:  No deficit noted with heel to shin or AZAM of LE    Vitals:  Blood Pressure at rest 153/89 mmHg  Blood Pressure post session 147/78 mmHg   Heart Rate at rest 62 bpm  Heart Rate post session 50 bpm    SPO2 at rest 95% on RA SPO2 post session 99% on RA         Functional Status Score-Intensive Care Unit (FSS-ICU)   Rolling 5/7   Supine to sit transfer 5/7   Unsupported sitting  5/7   Sit to stand transfers 4/7   Ambulation 4/7   Total  23/35     Therapeutic Exercises:    BLE ROM    Patient education  Pt educated on PT role, safety during functional mobility    Patient response to education:   Pt verbalized understanding Pt demonstrated skill Pt requires further education in this area   Yes  Yes  Reinforce      ASSESSMENT:    Conditions Requiring Skilled Therapeutic Intervention:    [x]Decreased strength     []Decreased ROM  [x]Decreased functional mobility  [x]Decreased balance   [x]Decreased endurance   []Decreased posture  []Decreased sensation  []Decreased coordination   []Decreased vision  [x]Decreased safety awareness   []Increased pain       Comments:  Pt received supine and agreeable to PT evaluation with OT collaboration. Pt cleared for participation by RN prior to session. Vitals monitored during session. Pt demonstrates some mild confusion and aphasia. Demonstrates some RUE weakness notably of R hand. BLE strength was good. Pt able sit up to EOB. Performed transfer to chair with assistance. Performed ambulation in hallway with mild unsteadiness but no LOB. Completed stairs with some assistance to steady and cues for use of hand rail. Pt ambulated back to room and into chair. Pt left with call button in reach, lines attached, and needs met.     Treatment:  Patient practiced and was instructed in the following treatment:     Bed mobility training - pt given verbal and tactile cues to facilitate proper sequencing and safety during rolling and supine>sit as well as provided with physical assistance to complete task     STS and pivot transfer training - pt educated on proper hand and foot placement, safety and sequencing, and use of no AD to safely complete sit<>stand and pivot transfers with hands on assistance to complete task safely    Gait training- pt was given verbal and tactile cues to facilitate safety/balance during ambulation as well as provided with physical assistance.  Stair training - Pt educated on proper safety and sequencing during stair ascension and descension. Verbal cues were given to facilitate safety/balance and hands on assistance provided for balance and fall prevention. Pt's/ family goals   1. home    Prognosis is fair for reaching above PT goals. Patient and or family understand(s) diagnosis, prognosis, and plan of care. yes    PHYSICAL THERAPY PLAN OF CARE:    PT POC is established based on physician order and patient diagnosis     Referring provider/PT Order:    05/10/22 0545  PT evaluation and treat     Yeni Cordova DO   Diagnosis:  Receptive aphasia [R47.01]  Right sided weakness [R53.1]  Stroke-like symptoms [R29.90]  Specific instructions for next treatment:  Progress activity     Current Treatment Recommendations:     [x] Strengthening to improve independence with functional mobility   [] ROM to improve independence with functional mobility   [x] Balance Training to improve static/dynamic balance and to reduce fall risk  [x] Endurance Training to improve activity tolerance during functional mobility   [x] Transfer Training to improve safety and independence with all functional transfers   [x] Gait Training to improve gait mechanics, endurance and asses need for appropriate assistive device  [x] Stair Training in preparation for safe discharge home and/or into the community   [x] Positioning to prevent skin breakdown and contractures  [x] Safety and Education Training   [x] Patient/Caregiver Education   [] HEP  [] Other     PT long term treatment goals are located in above grid    Frequency of treatments: 2-5x/week x 1-2 weeks.     Time in  1340  Time out  1405    Total Treatment Time  10 minutes     Evaluation Time includes thorough review of current medical information, gathering information on past medical history/social history and prior level of function, completion of standardized testing/informal observation of tasks, assessment of data and education on plan of care and goals.     CPT codes:  [x] Low Complexity PT evaluation 38772  [] Moderate Complexity PT evaluation 59077  [] High Complexity PT evaluation 08703  [] PT Re-evaluation 10565  [] Gait training 61734 -- minutes  [] Manual therapy [address] [facility] -- minutes  [x] Therapeutic activities 68898 10 minutes  [] Therapeutic exercises 31511 - minutes  [] Neuromuscular reeducation 53698 -- minutes     Zoe Wisdom, PT, DPT  [id number]

## 2022-05-10 NOTE — PROGRESS NOTES
[address]       Date:5/10/2022                                                               Patient Name: Todd Guillory  MRN: [id number]  : 1960  Room: [address] [place] [address]    Evaluating OT: Ruthie Bryant, OTR/L [id number]    Referring Provider: Hannah Foley DO   Specific Provider Orders/Date: OT eval and treat (5/10/22)       Diagnosis:  Receptive Aphasia    R weakness    Stroke Like Symptoms     Reason for admission: receptive aphasia right arm right leg weakness and right lower facial droop    Surgery/Procedures: N/A     Pertinent Medical History: HTN, thyroid disease       *Precautions:  Fall Risk, R hemiparesis, R visual field cut, aphasia    Assessment of current deficits   [x] Functional mobility  [x]ADLs  [x] Strength               [x]Cognition   [x] Functional transfers   [x] IADLs         [x] Safety Awareness   [x]Endurance   [x] Fine Coordination        [x] ROM     [x] Vision/perception   [x]Sensation    [x]Gross Motor Coordination [x] Balance   [] Delirium                  [x]Motor Control     [x] Communication    OT PLAN OF CARE   OT POC based on physician orders, patient diagnosis and results of clinical assessment.        Frequency/Duration: 1-3 days/wk for 1-2 weeks PRN    Specific OT Treatment to include:   ADL retraining/adapted techniques and AE recommendations to increase functional independence within precautions                    Energy conservation techniques to improve tolerance for selfcare routine   Functional transfer/mobility training/DME recommendations for increased independence, safety and fall prevention         Patient/family education to increase safety and functional independence within precautions              Environmental modifications for safe mobility and completion of ADLs                           Cognitive retraining ex's to improve problem solving skills & safe participation in ADLs/IADLs  Sensory re-education techniques to improve extremity awareness, maintain skin integrity and improve hand function                             Visual/Perceptual retraining  to improve body awareness and safety during transfers and ADLs  Splinting/positioning needs to maintain joint/skin integrity and prevent contractures  Therapeutic activity to improve functional performance during ADLs/IADLs                                         Therapeutic exercise to improve tolerance and functional strength for ADLs   Balance retraining exercises/tasks for facilitation of postural control with dynamic challenges during ADLs . Positioning to improve functional independence  Neuromuscular re-education: facilitation of righting/equilibrium reactions, normalization muscle tone/facilitation active functional movement                      Delirium prevention/treatment    Modified [place] Scale   Score     Description  0             No symptoms  1             No significant disability despite symptoms  2             Slight disability; able to look after own affairs  3             Moderate disability; able to ambulate without assist/ requires assist with ADLs  4             Moderate/Severe disability;requires assist to ambulate/assist with ADLs  5             Severe disability;bedridden/incontinent   6               Score:   4    Recommended Adaptive Equipment: TBA; shower seat, rails     Home Living: Pt lives with wife  in a 2 story home with 3-4 step(s) to enter and 1 rail(s); bed/bath on 2nd floor: flight with rail. Bathroom setup: walk in shower; tub (pt with difficulty reporting set up due to aphasia)  Equipment owned: reports shower seat and rail    Prior Level of Function: IND with ADLs;  IND with IADLs. No device for ambulation.    Driving: yes  Occupation: retired from [employer]    Pain Level: pt c/o 0/10  pain  this session    Cognition: A&O: 3-4/4    Follows 1-2 step commands with min redirection for comprehension/processing. Memory: 1/3 word recall; fairly WFL    Comprehension fair- simple instruction; delayed with complex instruction   Problem solving: fair-   Judgement/safety: fair-               Communication skills: min word finding difficulty; difficulty locating items/letters in R field when tracking. Vision: minimal R eye tracking; R visual field deficit               Glasses:no                                                   Hearing: WFL     RASS: 0  CAM-ICU: (NT) Delirium    UE Assessment:  Hand Dominance: Right [x]  Left []     ROM Strength STM goal: PRN   RUE  WFL proximal; decreased [address] & in hand manipulation skills 4-/5 proximal  3+/5 distal  WFL for ADLS; 4/5     LUE WFL 4/5        Sensation: No c/o numbness/tingling in extremities. Noted impaired finger ID L hand to light touch. Tone: WNL   Edema: WFL     Functional Assessment:  AM-PAC Daily Activity Raw Score: 15/24   Initial Eval Status  Date: 5/10 Treatment Status  Date: STGs = LTGs  Time frame: 7-14 days   Feeding NT                        Lanre  while seated up in chair to increase activity tolerance        Grooming Min A  standing sink level                        Lanre   while standing sink level requiring no deivce for balance and demonstrating G tolerance; using R UE as fair functional assist.     UB dressing/bathing Mod A                        Lanre       LB dressing/bathing Mod A                        Lanre   using AE as needed for safe reach/ energy conservation       Toileting Min A  (standing at sink)  *Assist for balance to pull pants up over hips.                         Lanre     Bed Mobility  Supine to sit:   SBA    Sit to supine:   NT                        Lanre  in prep of ADL tasks & transfers   Functional Transfers Sit to stand:   CGA    Stand to sit:   CGA                        Lanre  sit<>stand/functional bathroom transfers using AD/DME as needed for balance and safety Functional Mobility CGA   no device  (environmental cues- R side)                       Lanre   functional/bathroom mobility using AD as needed & demonstrating G safety     Balance Sitting:     Static:  S    Dynamic:SBA  Standing: CGA  Lanre dynamic sitting balance; Lanre dynamic standing balance  during ADL tasks & transfers   Endurance/Activity Tolerance   F tolerance with light activity. G   tolerance with moderate activity/self care routine   Visual/  Perceptual Impaired: R visual field                     Vitals:   HR at rest: 61 bpm HR at end of session: 51 bpm   Spo2 at rest:98% Spo2 at end of session 98%   BP at rest:153/89 mmHg BP at end of session 147/78 mmHg       Treatment: OT treatment provided this date includes:  Balance retraining: Performed sitting/standing balance ex's with instruction to facilitate righting reactions with postural changes during ADLS. ADL retraining: Instruction on adapted techniques to increase independence and safe reach during dressing/bathing activities. Pt demonstrated fair safety; cues to use R UE for function during tasks. Energy conservation: Education on breathing techniques, pacing, work simplification strategies & recommended bathroom DME for safety and energy conservation during self care tasks and activities of daily living. ROM/exercise: pt instructed on R UE ROM/coordination & in hand manipulation/wrist ex's to improve R UE hand function during ADLS. Pt demo fair understanding. Delirium Prevention: Environmental and sensory modifications assessed and implemented to decrease ICU acquired delirium and to improve overall orientation, mentation and pt interaction with family/staff. Line management and environmental modifications made prior to and end of session to ensure patient safety and to increase efficiency of session.   Skilled monitoring of HR, O2 saturation, blood pressure and patient's response to activity performed throughout session. Comments: OK from RN to see patient. Upon arrival, patient supine in bed, agreeable to session. Pt demo fair tolerance with good understanding of education/techniques. At end of session, patient left seated in chair to increase activity tolerance. Call light within reach, all lines and tubes intact. Pt instructed on use of call light for assistance and fall prevention. .    Patient presents with decreased ROM/strength,activity tolerance, dynamic balance, functional mobility limiting completion of ADLs and safety. Pt can benefit from continued skilled OT to increase safety, functional independence and quality of life. Rehab Potential: Good for established goals    Patient / Family Goal: to return to PLOF    Patient and/or family were instructed/educated on diagnosis, prognosis/goals and plan of care. Pt demonstrated fair understanding. Evaluation Complexity: low    [] Malnutrition indicators have been identified and nursing has been notified to ensure a dietitian consult is ordered. Time In:1332             Time Out: 1415         Total Treatment time: 28   Min Units   OT Eval Low 58617 X    OT Eval Medium 35880     OT Eval High 88765     OT Re-Eval [id number]     Therapeutic Ex 65236     Therapeutic Activities 63297 8 1   ADL/Self Care 23432 20 1   Orthotic Management 41990     Neuro Re-Ed 68035     Non-Billable Time        Evaluation time includes thorough review of current medical information, gathering information on past medical history/social history and prior level of function, completion of standardized testing/informal observation of tasks, assessment of data and development of POC/Goals.      Luis Salgado, OTR/L [id number]

## 2022-05-10 NOTE — PROGRESS NOTES
Admitted after midnight for possible CVA. Stroke work-up. Neurology consult. Patient evaluated. Continue current treatment.

## 2022-05-10 NOTE — ED NOTES
Per Dr. Prabhakar Edwards, pt has right arm and leg weakness with right facial droop     Tiffanie Herrera RN  05/09/22 [id number]

## 2022-05-10 NOTE — PLAN OF CARE
Patient doing well, NIHSS of 1-2. Continue medical management at this time.   Neurology consult recommended  No plans for intervention for intracranial stenosis  Per AHA and AAN guidelines medical management is recommended first line and second line

## 2022-05-10 NOTE — H&P
Hospitalist History & Physical      PCP: Malini Campbell MD    Date of Service: Pt seen/examined on 5/10/2022    Chief Complaint:  had concerns including Altered Mental Status. History Of Present Illness:    Mr. Janeth Herrera, a 59y.o. year old male  who  has a past medical history of Hypertension and Thyroid disease. Patient presented to the emergency department with strokelike symptoms. Patient was mowing the lawn when he became tired. He took a nap sleeping from 2:00 until 11:00 at night. When he woke up his wife noted that he was confused, having trouble speaking and weakness of the right lower and right upper extremity. He also had right-sided facial droop. Vital signs within normal limits and stable. Laboratory studies were unremarkable. NIH of 9. Imaging revealed no LVO. Intracranial stenosis of the left MCA. Cerebral perfusion imaging demonstrates a very tiny penumbra of 3 cc in the watershed territory and mild hypoperfusion in the watershed territory. Patient was outside the window for tPA. Telestroke was consulted. Advised aspirin, Brilinta. MRI of the brain in the morning. Keep patient NPO. Patient failed swallow study. Past Medical History:   Diagnosis Date    Hypertension     Thyroid disease        Past Surgical History:   Procedure Laterality Date    KNEE ARTHROSCOPY      bilateral knees       Prior to Admission medications    Medication Sig Start Date End Date Taking? Authorizing Provider   oxyCODONE-acetaminophen (PERCOCET) 5-325 MG per tablet Take 1 tablet by mouth every 4 hours as needed for Pain. Yes Historical Provider, MD   levothyroxine (SYNTHROID) 112 MCG tablet Take 88 mcg by mouth Daily    Historical Provider, MD   naproxen sodium (ANAPROX DS) 550 MG tablet Take 1 tablet by mouth 2 times daily (with meals) for 10 days. 10/2/12 10/12/12  Bayron Green,    amLODIPine (NORVASC) 5 MG tablet Take 5 mg by mouth daily.       Historical Provider, MD lisinopril (PRINIVIL;ZESTRIL) 10 MG tablet Take 10 mg by mouth daily. Historical Provider, MD   naproxen (NAPROSYN) 500 MG tablet Take 1 tablet by mouth 2 times daily for 7 days. 7/10/12 7/17/12  Keisha Ramírez DO         Allergies:  Codeine    Social History:    TOBACCO:   reports that he has never smoked. He has never used smokeless tobacco.  ETOH:   reports no history of alcohol use. Family History:    Reviewed in detail and negative for DM, CAD, Cancer, CVA. Positive as follows\"  History reviewed. No pertinent family history. REVIEW OF SYSTEMS:   Pertinent positives as noted in the HPI. All other systems reviewed and negative. PHYSICAL EXAM:  BP (!) 148/90   Pulse 54   Temp 97.8 °F (36.6 °C) (Oral)   Resp 14   Ht 5' 6\" (1.676 m)   Wt 135 lb (61.2 kg)   SpO2 96%   BMI 21.79 kg/m²   General appearance: No apparent distress, appears stated age and cooperative. HEENT: Normal cephalic, atraumatic without obvious deformity. Pupils equal, round, and reactive to light. Extra ocular muscles intact. Conjunctivae/corneas clear. Neck: Supple, with full range of motion. No jugular venous distention. Trachea midline. Respiratory: Clear to auscultation bilaterally  Cardiovascular: Regular rate and rhythm  Abdomen: Soft, nontender, nondistended  Musculoskeletal: No clubbing, cyanosis, edema of bilateral lower extremities. Brisk capillary refill. Skin: Normal skin color. No rashes or lesions. Neurologic: Receptive aphasia, right-sided weakness      CBC:   Recent Labs     05/09/22 2215   WBC 9.1   RBC 5.05   HGB 14.9   HCT 42.0   MCV 83.2   RDW 12.7        BMP:   Recent Labs     05/09/22 2215      K 3.4*   CL 99   CO2 23   BUN 14   CREATININE 1.4*   MG 2.1     LFT:  Recent Labs     05/09/22 2215   PROT 7.9   ALKPHOS 94   ALT 8   AST 14   BILITOT 0.4     CE:  No results for input(s): Shaquille Salgado in the last 72 hours.   PT/INR:   Recent Labs     05/09/22 2216   INR 1.1   APTT 33.7     BNP: No results for input(s): BNP in the last 72 hours. ESR:   Lab Results   Component Value Date    SEDRATE 32 (H) 02/27/2014     CRP:   Lab Results   Component Value Date    CRP 1.6 (H) 04/20/2011     D Dimer: No results found for: DDIMER   Folate and B12: No results found for: [id number], No results found for: FOLATE  Lactic Acid:   Lab Results   Component Value Date    LACTA 1.8 05/09/2022     Thyroid Studies:   Lab Results   Component Value Date    TSH 3.250 02/17/2017    A5QRUDC 9.6 02/13/2015       Oupatient labs:  Lab Results   Component Value Date    CHOL 139 02/13/2015    TRIG 82 02/13/2015    HDL 42 02/13/2015    LDLCALC 81 02/13/2015    TSH 3.250 02/17/2017    PSA 4.16 (H) 01/04/2022    INR 1.1 05/09/2022    LABA1C 6.1 (H) 08/01/2014       Urinalysis:    Lab Results   Component Value Date    NITRU Negative 05/09/2022    WBCUA NONE 05/09/2022    BACTERIA NONE SEEN 05/09/2022    RBCUA >20 05/09/2022    BLOODU LARGE 05/09/2022    SPECGRAV <=1.005 05/09/2022    GLUCOSEU Negative 05/09/2022       Imaging:  XR CHEST PORTABLE    Result Date: 5/9/2022  EXAMINATION: ONE XRAY VIEW OF THE CHEST 5/9/2022 10:36 pm COMPARISON: None. HISTORY: ORDERING SYSTEM PROVIDED HISTORY: ams TECHNOLOGIST PROVIDED HISTORY: Reason for exam:->ams FINDINGS: Single AP upright portable chest demonstrates mild prominence of the pulmonary vascularity but no evidence of focal alveolar infiltrate or effusion. There is tortuosity of the aorta without cardiomegaly. There is no evidence of a pneumothorax. No acute cardiopulmonary process. CTA NECK W CONTRAST    Result Date: 5/10/2022  EXAMINATION: CTA OF THE HEAD WITH CONTRAST; CTA OF THE NECK 5/9/2022 10:13 pm: TECHNIQUE: CTA of the head/brain was performed with the administration of intravenous contrast. Multiplanar reformatted images are provided for review. MIP images are provided for review.  Dose modulation, iterative reconstruction, and/or weight based adjustment of the mA/kV was utilized to reduce the radiation dose to as low as reasonably achievable.; CTA of the neck was performed with the administration of intravenous contrast. Multiplanar reformatted images are provided for review. MIP images are provided for review. Stenosis of the internal carotid arteries measured using NASCET criteria. Dose modulation, iterative reconstruction, and/or weight based adjustment of the mA/kV was utilized to reduce the radiation dose to as low as reasonably achievable. COMPARISON: None. HISTORY: ORDERING SYSTEM PROVIDED HISTORY: AMS, Aphasia TECHNOLOGIST PROVIDED HISTORY: Reason for exam:->AMS, Aphasia Has a \"code stroke\" or \"stroke alert\" been called? ->Yes Decision Support Exception - unselect if not a suspected or confirmed emergency medical condition->Emergency Medical Condition (MA); ORDERING SYSTEM PROVIDED HISTORY: Aphasia TECHNOLOGIST PROVIDED HISTORY: Reason for exam:->aphasia Has a \"code stroke\" or \"stroke alert\" been called? ->Yes Decision Support Exception - unselect if not a suspected or confirmed emergency medical condition->Emergency Medical Condition (MA) FINDINGS: CTA NECK: AORTIC ARCH/ARCH VESSELS: No dissection or arterial injury. No significant stenosis of the brachiocephalic or subclavian arteries. CAROTID ARTERIES: Mild atherosclerotic plaque at the right carotid bifurcation, without evidence of hemodynamically significant stenosis. The left carotid bifurcation is normal. VERTEBRAL ARTERIES: No dissection, arterial injury, or significant stenosis. SOFT TISSUES: The lung apices are clear. No cervical or superior mediastinal lymphadenopathy. The larynx and pharynx are unremarkable. No acute abnormality of the salivary and thyroid glands. BONES: No acute osseous abnormality. CTA HEAD: ANTERIOR CIRCULATION: Filling defects are noted within the left M2 branch of the left MCA, best visualized on series 7, image 58. Findings are suspicious for thrombus.   Also noted are findings suspicious for a filling defect within an insular branch of the left middle cerebral artery. The distal MCA branches are patent. The right MCA and remainder of the anterior circulation are normal in appearance. POSTERIOR CIRCULATION: A short segment of mild stenosis involving the right proximal intracranial vertebral artery. Remainder of the posterior circulation is normal in appearance. OTHER: No dural venous sinus thrombosis on this non-dedicated study. BRAIN: No mass effect or midline shift. No extra-axial fluid collection. The gray-white differentiation is maintained. No mass effect or midline shift. No evidence of impending herniation. Subtle hypodensity noted within the left corona radiata on series 2, image 34. This may represent an age-indeterminate infarction. Encephalomalacia within the inferior right cerebellar hemisphere, suggesting sequela of chronic infarction. The ventricles and cisterns are normal.  No extra-axial mass or fluid collection is seen. The orbits and extracranial scalp soft tissues are normal.  The visualized paranasal sinuses and mastoid air cells are well aerated. No acute calvarial abnormality is seen. Findings suspicious for thrombus within the left M branch of the MCA, as well as a in insular branch of the left MCA. Age-indeterminate parenchymal hypodensities within the left cerebral hemisphere as above, which raise suspicion for acute, nonhemorrhagic infarction. These findings were discussed with Dr. Shawna Fontana at 11:31 p.m. on 05/09/2022. CT BRAIN PERFUSION    Result Date: 5/10/2022  EXAMINATION: CT OF THE HEAD WITH CONTRAST 5/10/2022 12:40 am: TECHNIQUE: CT of the head/brain was performed with the administration of intravenous contrast. Multiplanar reformatted images are provided for review. Dose modulation, iterative reconstruction, and/or weight based adjustment of the mA/kV was utilized to reduce the radiation dose to as low as reasonably achievable. COMPARISON: None. HISTORY: ORDERING SYSTEM PROVIDED HISTORY: CVA FINDINGS: CT PERFUSION: EXAM QUALITY: The examination is diagnostic with appropriate arterial inflow and venous outflow curves, and diagnostic perfusion maps. CORE INFARCT: The total area of ischemic core is 0 mL (CBF<30% volume). TOTAL HYPOPERFUSION: The total area of hypoperfusion is calculated at 3 mL, likely artifactual (Tmax>6s volume). PENUMBRA: The penumbra (mismatch) volume is 3 mL and is likely artifactual.     1. No significant perfusion mismatch. CTA HEAD W CONTRAST    Result Date: 5/10/2022  EXAMINATION: CTA OF THE HEAD WITH CONTRAST; CTA OF THE NECK 5/9/2022 10:13 pm: TECHNIQUE: CTA of the head/brain was performed with the administration of intravenous contrast. Multiplanar reformatted images are provided for review. MIP images are provided for review. Dose modulation, iterative reconstruction, and/or weight based adjustment of the mA/kV was utilized to reduce the radiation dose to as low as reasonably achievable.; CTA of the neck was performed with the administration of intravenous contrast. Multiplanar reformatted images are provided for review. MIP images are provided for review. Stenosis of the internal carotid arteries measured using NASCET criteria. Dose modulation, iterative reconstruction, and/or weight based adjustment of the mA/kV was utilized to reduce the radiation dose to as low as reasonably achievable. COMPARISON: None. HISTORY: ORDERING SYSTEM PROVIDED HISTORY: AMS, Aphasia TECHNOLOGIST PROVIDED HISTORY: Reason for exam:->AMS, Aphasia Has a \"code stroke\" or \"stroke alert\" been called? ->Yes Decision Support Exception - unselect if not a suspected or confirmed emergency medical condition->Emergency Medical Condition (MA); ORDERING SYSTEM PROVIDED HISTORY: Aphasia TECHNOLOGIST PROVIDED HISTORY: Reason for exam:->aphasia Has a \"code stroke\" or \"stroke alert\" been called? ->Yes Decision Support Exception - unselect if not a suspected or confirmed emergency medical condition->Emergency Medical Condition (MA) FINDINGS: CTA NECK: AORTIC ARCH/ARCH VESSELS: No dissection or arterial injury. No significant stenosis of the brachiocephalic or subclavian arteries. CAROTID ARTERIES: Mild atherosclerotic plaque at the right carotid bifurcation, without evidence of hemodynamically significant stenosis. The left carotid bifurcation is normal. VERTEBRAL ARTERIES: No dissection, arterial injury, or significant stenosis. SOFT TISSUES: The lung apices are clear. No cervical or superior mediastinal lymphadenopathy. The larynx and pharynx are unremarkable. No acute abnormality of the salivary and thyroid glands. BONES: No acute osseous abnormality. CTA HEAD: ANTERIOR CIRCULATION: Filling defects are noted within the left M2 branch of the left MCA, best visualized on series 7, image 58. Findings are suspicious for thrombus. Also noted are findings suspicious for a filling defect within an insular branch of the left middle cerebral artery. The distal MCA branches are patent. The right MCA and remainder of the anterior circulation are normal in appearance. POSTERIOR CIRCULATION: A short segment of mild stenosis involving the right proximal intracranial vertebral artery. Remainder of the posterior circulation is normal in appearance. OTHER: No dural venous sinus thrombosis on this non-dedicated study. BRAIN: No mass effect or midline shift. No extra-axial fluid collection. The gray-white differentiation is maintained. No mass effect or midline shift. No evidence of impending herniation. Subtle hypodensity noted within the left corona radiata on series 2, image 34. This may represent an age-indeterminate infarction. Encephalomalacia within the inferior right cerebellar hemisphere, suggesting sequela of chronic infarction. The ventricles and cisterns are normal.  No extra-axial mass or fluid collection is seen.  The orbits and extracranial scalp soft tissues are normal.  The visualized paranasal sinuses and mastoid air cells are well aerated. No acute calvarial abnormality is seen. Findings suspicious for thrombus within the left M branch of the MCA, as well as a in insular branch of the left MCA. Age-indeterminate parenchymal hypodensities within the left cerebral hemisphere as above, which raise suspicion for acute, nonhemorrhagic infarction. These findings were discussed with Dr. Shawna Fontana at 11:31 p.m. on 05/09/2022. ASSESSMENT:  -Acute ischemic stroke  -Receptive aphasia  -Right-sided weakness  -Hypertension  Hypothyroidism    PLAN:  -Admit to medicine  -Consult neurology  -MRI of the brain without contrast  -Telemetry  -N.p.o. now  -Normal saline 80 mL/h  -PT/OT/SLP        Diet: No diet orders on file  Code Status: No Order  Surrogate decision maker confirmed with patient:   Extended Emergency Contact Information  Primary Emergency Contact: Vikash Scott  Address: [address] Phone: [phone]  Relation: Spouse    DVT Prophylaxis: []Lovenox []Heparin []PCD [] [address] []Encouraged ambulation  Disposition: []Med/Surg [] Intermediate [] ICU/CCU  Admit status: [] Observation [] Inpatient     +++++++++++++++++++++++++++++++++++++++++++++++++  Louise Erazo, DO  +++++++++++++++++++++++++++++++++++++++++++++++++  NOTE: This report was transcribed using voice recognition software. Every effort was made to ensure accuracy; however, inadvertent computerized transcription errors may be present.

## 2022-05-10 NOTE — VIRTUAL HEALTH
Consults  Patient Location:  [address] [place] [address] Emergency Department    Provider Location ([facility]/State): [facility]      This virtual visit was ED telephone- telestroke consult, for inpatient care please consult Neurology on call              I was contacted by the ED team  to review an acute code stroke  and review of cerebral vasculature imaging study to investigate if there is an large vessel occlusion. The patient has a NIHSS of 9 . Head CT is negative for ICH some loss of grey white differentiation. The last known well time was reported as 12 pm 5/9/22    The cerebral vascular imaging demonstrates No LVO. Intracranial stenosis of the left MCA    The cerebral perfusion imaging demonstrates a very very tiny penumbra of 3 cc in the watershed territory, and mild hypoperfusion in the watershed territory too     VIZ LVO was utilized to assist with triage      Assessment:  1. Suspected acute ischemic stroke secondary to left MCA stenosis with no signficant perfusion mismatch   Very Small penumbra 3cc    Plan:  1. Outside the window for tpa, changes seen on CT, not a great WAKE up candidate  2. No large vessel occlusion noted. NO significant perfusion deficit to justify emergent thrombectomy. No endovascular stroke therapy needed. Medical management of intracranial stenosis with ASA 81 + birlinta 180 mg load followed by 90 mg BID  IVF 1cc/kg/hr for 12 hours  MRI brain in the morning when MRI team arrives. Patient should be first priority due to stroke   Keep NPO   If patient symptoms fail to improve with medical management will discuss alternative options for intracranial stenosis     Neurology and Hospitalist to follow closely.       Discussed with ED team    TIME SPENT IN MEDICAL DECISION MAKING / REVIEW OF CASE AND IMAGING / DISCUSSION OF CASE WITH PHYSICIANS INVOLVED IN  THE ACUTE CARE= 35 min

## 2022-05-10 NOTE — PROGRESS NOTES
SPEECH/LANGUAGE PATHOLOGY  SPEECH/LANGUAGE/COGNITIVE EVALUATION   and PLAN OF CARE      PATIENT NAME:  Val Haynes  (male)     MRN:  [id number]    :  1960  (59 y.o.)  STATUS:  Inpatient: Room 4524/4524-A    TODAY'S DATE:  5/10/2022  05/10/22 0545   Speech Language Pathology (SLP) eval and treat Start: 05/10/22 0545, End: 05/10/22 0545, ONE TIME, Standing Count: 1 Occurrences, R    Sonia Soler DO   REASON FOR REFERRAL: stroke like symptoms   EVALUATING THERAPIST: ALFREDO Feng    ADMITTING DIAGNOSIS: Receptive aphasia [R47.01]  Right sided weakness [R53.1]  Stroke-like symptoms [R29.90]    VISIT DIAGNOSIS:   Visit Diagnoses       Codes    Receptive aphasia    -  Primary R47.01    Right sided weakness     R53.1             SPEECH THERAPY  PLAN OF CARE   The speech therapy  POC is established based on physician order, speech pathology diagnosis and results of clinical assessment     SPEECH PATHOLOGY DIAGNOSIS:    Mild Anomia, Mild Cognitive deficits     Speech Pathology intervention is recommended up to 6 times per week for LOS or when goals are met with emphasis on the following:   Anticipate Patient will benefit from ongoing intensive speech therapy at discharge due to degree of deficits     Conditions Requiring Skilled Therapeutic Intervention for speech, language and/or cognition    Expressive Aphasia   Anomia  Decreased short term memory    Specific Speech Therapy Interventions to Include:   Expressive language training   Therapeutic tasks for Cognition    Specific instructions for next treatment:      To initiate POC    SHORT/LONG TERM GOALS  Pt will improve immediate, short term, recent memory during structured and unstructured tasks with 90% accuracy   Pt will improve receptive and expressive language skills with adequate thought content, organization, and processing time to facilitate improved communication with minimal.  Pt will improve word finding and verbal fluency with phrase/sentence level, wh-questions and open ended conversation through incorporation of preparatory and circumlocution strategies 90% accuracy    Patient goals: Patient/family involved in developing goals and treatment plan:   Treatment goals discussed with Patient and Family    The Patient and Family understand(s) the diagnosis, prognosis and plan of care   The patient/family Agreed with above,     This plan may be re-evaluated and revised as warranted. Rehabilitation Potential/Prognosis: good                CLINICAL ASSESSMENT:  MOTOR SPEECH       Oral Peripheral Examination   Left labiobuccal weakness-- minimal     Parameters of Speech Production  Respiration:  Adequate for speech production  Articulation:  Within functional limits  Resonance:  Within functional limits  Quality:   Within functional limits  Pitch: Within functional limits  Intensity: Within functional limits  Fluency:  Intact  Prosody Intact    RECEPTIVE LANGUAGE    Comprehension of Yes/No Questions: Within functional limits    Process  Simple Verbal Commands:   Within functional limits  Process Intermediate Verbal Commands:   Within functional limits  Process Complex Verbal Commands:     Within functional limits    Comprehension of Conversation:      Within functional limits      EXPRESSIVE LANGUAGE     Serials: Impaired    Imitation:  Words   Functional   Sentences Functional    Naming:  (Modality used:  Verbal)  Confrontation Naming  Functional  Divergent Naming Impaired  Response Naming: Functional    Conversation:      Anomia was present    COGNITION     Attention/Orientation  Attention: Sustained attention   Orientation:  Oriented to Person, Place, Date, Reason for hospitalization    Memory   Immediate Recall: Repeated 3/3    Delayed Recall:   Recalled 1/3     Long Term Recall:   Recalled Address, Birthdate and Family    Organization/Problem Solving/Reasoning   Verbal Sequencing:    To be assessed        Verbal Problem solving: Functional          CLINICAL OBSERVATIONS NOTED DURING THE EVALUATION  Perseveration errors and Anomic errors                  EDUCATION:   The Speech Language Pathologist (SLP) completed education regarding results of evaluation and that intervention is warranted at this time. Learner: Patient, Significant Other and Family  Education: Reviewed results and recommendations of this evaluation  Evaluation of Education:  Verbalizes understanding    Evaluation Time includes thorough review of current medical information, gathering information on past medical history/social history and prior level of function, completion of standardized testing/informal observation of tasks, assessment of data and education on plan of care and goals. CPT code:    50171  eval speech sound lang comprehension    90134  speech/language tx    Pt and family edu'd on use of semantic feature analysis. All parties highly receptive and voiced an understanding. Pt appears to attempt to self cues during periods of anomia. Edu'd on cause of aphasia and period of spontaneous recovery, etc.         The admitting diagnosis and active problem list, as listed below have been reviewed prior to initiation of this evaluation.         ACTIVE PROBLEM LIST:   Patient Active Problem List   Diagnosis    Stroke-like symptoms    HTN (hypertension)    Hypothyroidism       Can Whitney, CCC-SLP  Speech-Language Pathologist  [id number]  5/10/2022

## 2022-05-10 NOTE — CONSULTS
NEUROLOGY CONSULT NOTE      Requesting Physician: Forrest Armstrong DO    Reason for Consult:  Evaluate for acute CVA. History of Present Illness:  Sara Hendrix is a 59 y.o. male  with h/o HTN and thyroid disease, who was admitted to [facility]  on 5/9/2022 with presentation of right-sided weakness and possible aphasia. On day of admission, patient had awoken from a nap at approximately 10 PM with note of impaired comprehension and reported inability to walk associated with right-sided weakness. There was right facial droop present. No abnormal sensations or sensory deficits were reported. His last known well was 2 PM that day when he laid down for his nap. No report of any precipitating events or factors. On presentation to the ED, his NIHSS was 9 with blood pressure of 156/83. CTA of head/neck was done for stroke evaluation with results of \"findings suspicious for thrombus within the left M2 branch of the left MCA. \"  There was also presence of a subtle hypodensity within the left corona radiata of possible age-indeterminant infarction along with encephalomalacia in the right inferior cerebellar hemisphere suggestive of remote infarct. However, CT perfusion study did not show any evidence of an ischemic penumbra to indicate acute stroke. Patient subsequently admitted for further evaluation. Past Medical History:        Diagnosis Date    Hypertension     Thyroid disease            Procedure Laterality Date    KNEE ARTHROSCOPY      bilateral knees       Social History:  Social History     Tobacco Use   Smoking Status Never Smoker   Smokeless Tobacco Never Used     Social History     Substance and Sexual Activity   Alcohol Use No     Social History     Substance and Sexual Activity   Drug Use No         Family History:   History reviewed. No pertinent family history. Review of Systems:  Not available    Allergies:     Allergies   Allergen Reactions    Codeine Nausea And Vomiting        Current Medications:   ondansetron (ZOFRAN-ODT) disintegrating tablet 4 mg, Q8H PRN   Or  ondansetron (ZOFRAN) injection 4 mg, Q6H PRN  polyethylene glycol (GLYCOLAX) packet 17 g, Daily PRN  enoxaparin (LOVENOX) injection 40 mg, Daily  amLODIPine (NORVASC) tablet 10 mg, Daily  levothyroxine (SYNTHROID) tablet 88 mcg, Daily  lisinopril (PRINIVIL;ZESTRIL) tablet 10 mg, Daily  aspirin chewable tablet 81 mg, Daily  atorvastatin (LIPITOR) tablet 40 mg, Nightly  ticagrelor (BRILINTA) tablet 90 mg, BID       Physical Exam:  BP (!) 143/90   Pulse 69   Temp 98.3 °F (36.8 °C) (Oral)   Resp 19   Ht 5' 6\" (1.676 m)   Wt 143 lb 9.6 oz (65.1 kg)   SpO2 95%   BMI 23.18 kg/m²  I Body mass index is 23.18 kg/m². I   Wt Readings from Last 1 Encounters:   05/10/22 143 lb 9.6 oz (65.1 kg)          HEENT: Normocephalic, atraumatic, no lesions or abnormalities noted. Neck:  supple with full ROM; no masses, nodes or bruits; no cervical tenderness on palpation. Lungs:  clear to auscultation  bilaterally     CV: RRR without gallops or murmurs     Extremities: no c/c/e      Back: no tenderness with palpation / Tenderness per diagram ; no scoliosis; no kyphosis negative straight leg raising normal ROM in low back, pelvis, hips         Neurologic Exam     Mental Status:  Patient was alert, responsive, oriented, appropriate, answering questions, and following commands. Speech was fluent with normal sensorium and cognition. Cranial Nerves: Pupils were equal round and reactive to light and accommodation;    Visual fields were full on confrontation; Extraocular movements were intact; no nystagmus; Intact facial sensation to temp, pinprick, and light touch; Symmetric facial movements with good lip and eye closure bilaterally; Hearing was intact; Vo was midline;    Normal palatal elevation with midline uvula  Trapezius strength of 5/5.      Tongue was midline with no atrophy or fasciculations  Motor Exam:  Strength was 5/5 throughout; normal tone and bulk; no atrophy or fasiculations noted. Sensory Exam:  Normal sensation to light touch, pin-prick, and temperature; No sensory extinction. Cerebella Exam:  Intact finger-nose-finger, rapidly alternating movements, fine motor movements, and heel-down-shin maneuvers; No cerebellar rebound or drift; No tremors   Normal tandem gait. Negative Romberg   Gait:  Gait was not tested. Reflexes: normal and symmetric bilaterally; Babinski is negative    Labs:    CBC:   Recent Labs     05/09/22  2215 05/10/22  0535   WBC 9.1 6.9   HGB 14.9 14.1    356   MCV 83.2 86.0   MCH 29.5 29.4   MCHC 35.5* 34.1   RDW 12.7 12.7     CMP:  Recent Labs     05/09/22  2215 05/10/22  0535    145   K 3.4* 3.6   CL 99 105   CO2 23 31*   BUN 14 11   CREATININE 1.4* 1.1   GFRAA >60 >60   LABGLOM >60 >60   GLUCOSE 168* 130*   CALCIUM 9.5 9.4     Liver:   Recent Labs     05/09/22 2215   AST 14   ALT 8   ALKPHOS 94   PROT 7.9   LABALBU 4.2   BILITOT 0.4     INR:   Recent Labs     05/09/22 2216   PROTIME 12.6*   INR 1.1       ToxicologyNo results for input(s): PHENYTOIN, CARBTOT, PHENOBARB, VALPROATE in the last 72 hours. Invalid input(s): LAMOTRIG,  KEPPRA  No results for input(s): AMPMETHURSCR, BARBTQTU, BDZQTU, CANNABQUANT, COCMETQTU, OPIAU, PCPQUANT in the last 72 hours. Radiology:  XR CHEST PORTABLE    Result Date: 5/9/2022  EXAMINATION: ONE XRAY VIEW OF THE CHEST 5/9/2022 10:36 pm COMPARISON: None. HISTORY: ORDERING SYSTEM PROVIDED HISTORY: ams TECHNOLOGIST PROVIDED HISTORY: Reason for exam:->ams FINDINGS: Single AP upright portable chest demonstrates mild prominence of the pulmonary vascularity but no evidence of focal alveolar infiltrate or effusion. There is tortuosity of the aorta without cardiomegaly. There is no evidence of a pneumothorax. No acute cardiopulmonary process.      CTA NECK W CONTRAST    Result Date: 5/10/2022  EXAMINATION: CTA OF THE HEAD pharynx are unremarkable. No acute abnormality of the salivary and thyroid glands. BONES: No acute osseous abnormality. CTA HEAD: ANTERIOR CIRCULATION: Filling defects are noted within the left M2 branch of the left MCA, best visualized on series 7, image 58. Findings are suspicious for thrombus. Also noted are findings suspicious for a filling defect within an insular branch of the left middle cerebral artery. The distal MCA branches are patent. The right MCA and remainder of the anterior circulation are normal in appearance. POSTERIOR CIRCULATION: A short segment of mild stenosis involving the right proximal intracranial vertebral artery. Remainder of the posterior circulation is normal in appearance. OTHER: No dural venous sinus thrombosis on this non-dedicated study. BRAIN: No mass effect or midline shift. No extra-axial fluid collection. The gray-white differentiation is maintained. No mass effect or midline shift. No evidence of impending herniation. Subtle hypodensity noted within the left corona radiata on series 2, image 34. This may represent an age-indeterminate infarction. Encephalomalacia within the inferior right cerebellar hemisphere, suggesting sequela of chronic infarction. The ventricles and cisterns are normal.  No extra-axial mass or fluid collection is seen. The orbits and extracranial scalp soft tissues are normal.  The visualized paranasal sinuses and mastoid air cells are well aerated. No acute calvarial abnormality is seen. Findings suspicious for thrombus within the left M branch of the MCA, as well as a in insular branch of the left MCA. Age-indeterminate parenchymal hypodensities within the left cerebral hemisphere as above, which raise suspicion for acute, nonhemorrhagic infarction. These findings were discussed with Dr. Rafal Gardner at 11:31 p.m. on 05/09/2022.      CT BRAIN PERFUSION    Result Date: 5/10/2022  EXAMINATION: CT OF THE HEAD WITH CONTRAST 5/10/2022 12:40 am: TECHNIQUE: CT of the head/brain was performed with the administration of intravenous contrast. Multiplanar reformatted images are provided for review. Dose modulation, iterative reconstruction, and/or weight based adjustment of the mA/kV was utilized to reduce the radiation dose to as low as reasonably achievable. COMPARISON: None. HISTORY: ORDERING SYSTEM PROVIDED HISTORY: CVA FINDINGS: CT PERFUSION: EXAM QUALITY: The examination is diagnostic with appropriate arterial inflow and venous outflow curves, and diagnostic perfusion maps. CORE INFARCT: The total area of ischemic core is 0 mL (CBF<30% volume). TOTAL HYPOPERFUSION: The total area of hypoperfusion is calculated at 3 mL, likely artifactual (Tmax>6s volume). PENUMBRA: The penumbra (mismatch) volume is 3 mL and is likely artifactual.     1. No significant perfusion mismatch. CTA HEAD W CONTRAST    Result Date: 5/10/2022  EXAMINATION: CTA OF THE HEAD WITH CONTRAST; CTA OF THE NECK 5/9/2022 10:13 pm: TECHNIQUE: CTA of the head/brain was performed with the administration of intravenous contrast. Multiplanar reformatted images are provided for review. MIP images are provided for review. Dose modulation, iterative reconstruction, and/or weight based adjustment of the mA/kV was utilized to reduce the radiation dose to as low as reasonably achievable.; CTA of the neck was performed with the administration of intravenous contrast. Multiplanar reformatted images are provided for review. MIP images are provided for review. Stenosis of the internal carotid arteries measured using NASCET criteria. Dose modulation, iterative reconstruction, and/or weight based adjustment of the mA/kV was utilized to reduce the radiation dose to as low as reasonably achievable. COMPARISON: None. HISTORY: ORDERING SYSTEM PROVIDED HISTORY: AMS, Aphasia TECHNOLOGIST PROVIDED HISTORY: Reason for exam:->AMS, Aphasia Has a \"code stroke\" or \"stroke alert\" been called? ->Yes Decision Support Exception - unselect if not a suspected or confirmed emergency medical condition->Emergency Medical Condition (MA); ORDERING SYSTEM PROVIDED HISTORY: Aphasia TECHNOLOGIST PROVIDED HISTORY: Reason for exam:->aphasia Has a \"code stroke\" or \"stroke alert\" been called? ->Yes Decision Support Exception - unselect if not a suspected or confirmed emergency medical condition->Emergency Medical Condition (MA) FINDINGS: CTA NECK: AORTIC ARCH/ARCH VESSELS: No dissection or arterial injury. No significant stenosis of the brachiocephalic or subclavian arteries. CAROTID ARTERIES: Mild atherosclerotic plaque at the right carotid bifurcation, without evidence of hemodynamically significant stenosis. The left carotid bifurcation is normal. VERTEBRAL ARTERIES: No dissection, arterial injury, or significant stenosis. SOFT TISSUES: The lung apices are clear. No cervical or superior mediastinal lymphadenopathy. The larynx and pharynx are unremarkable. No acute abnormality of the salivary and thyroid glands. BONES: No acute osseous abnormality. CTA HEAD: ANTERIOR CIRCULATION: Filling defects are noted within the left M2 branch of the left MCA, best visualized on series 7, image 58. Findings are suspicious for thrombus. Also noted are findings suspicious for a filling defect within an insular branch of the left middle cerebral artery. The distal MCA branches are patent. The right MCA and remainder of the anterior circulation are normal in appearance. POSTERIOR CIRCULATION: A short segment of mild stenosis involving the right proximal intracranial vertebral artery. Remainder of the posterior circulation is normal in appearance. OTHER: No dural venous sinus thrombosis on this non-dedicated study. BRAIN: No mass effect or midline shift. No extra-axial fluid collection. The gray-white differentiation is maintained. No mass effect or midline shift. No evidence of impending herniation.   Subtle hypodensity noted within the left corona radiata on series 2, image 34. This may represent an age-indeterminate infarction. Encephalomalacia within the inferior right cerebellar hemisphere, suggesting sequela of chronic infarction. The ventricles and cisterns are normal.  No extra-axial mass or fluid collection is seen. The orbits and extracranial scalp soft tissues are normal.  The visualized paranasal sinuses and mastoid air cells are well aerated. No acute calvarial abnormality is seen. Findings suspicious for thrombus within the left M branch of the MCA, as well as a in insular branch of the left MCA. Age-indeterminate parenchymal hypodensities within the left cerebral hemisphere as above, which raise suspicion for acute, nonhemorrhagic infarction. These findings were discussed with Dr. Shashank Ramirez at 11:31 p.m. on 05/09/2022. The patient's records from referring provider and available information in the EHR was reviewed. Impression:  1. Acute CVA: Left MCA territory with suspicion of embolic event. 2. Stroke risk factors: HTN, age    Principal Problem:    Stroke-like symptoms  Active Problems:    HTN (hypertension)    Hypothyroidism  Resolved Problems:    * No resolved hospital problems. *      Recommendations:                                            1. DAPT with Aspirin and Brillinta per Dr. Milind Marion recommendation  2. Completion of stroke work-up  3. Further on follow-up. It was my pleasure to evaluate Starleen Or today. Please call with questions.       Electronically signed by Vale Barnes MD on 5/10/2022 at 6:25 PM

## 2022-05-10 NOTE — VIRTUAL HEALTH
Consults  Patient Location:  [address] [place] [address] Emergency Department    Provider Location (City/State): [place], [place]    This virtual visit was conducted via interactive/real-time audio/video. [address] [facility] Stroke and Vascular Neurology Consult for  [facility] [place] Stroke Alert through [address] @ 10:48pm  5/9/2022 10:51 PM  Pt Name: Dipika Simental  MRN: [id number]  [place]: 1960  Date of evaluation: 5/9/2022  Primary Care Physician: Delores Reina MD  Reason for Evaluation: Stroke evaluation with Phone Consult, Discussion and Review of imaging    Dipika Simental is a 59 y.o. male when to sleep around 2pm and woke up at 10pm and found to have right hemiparesis and aphsasia    LKW: 2pm  NIH:  11    Allergies  is allergic to codeine. Medications  Prior to Admission medications    Medication Sig Start Date End Date Taking? Authorizing Provider   oxyCODONE-acetaminophen (PERCOCET) 5-325 MG per tablet Take 1 tablet by mouth every 4 hours as needed for Pain. Yes Historical Provider, MD   levothyroxine (SYNTHROID) 112 MCG tablet Take 88 mcg by mouth Daily    Historical Provider, MD   naproxen sodium (ANAPROX DS) 550 MG tablet Take 1 tablet by mouth 2 times daily (with meals) for 10 days. 10/2/12 10/12/12  Bayron Green DO   amLODIPine (NORVASC) 5 MG tablet Take 5 mg by mouth daily. Historical Provider, MD   lisinopril (PRINIVIL;ZESTRIL) 10 MG tablet Take 10 mg by mouth daily. Historical Provider, MD   naproxen (NAPROSYN) 500 MG tablet Take 1 tablet by mouth 2 times daily for 7 days. 7/10/12 7/17/12  Soniya Mendez DO    Scheduled Meds:  Continuous Infusions:  PRN Meds:.  Past Medical History   has a past medical history of Hypertension and Thyroid disease.   Social History  Social History     Socioeconomic History    Marital status:      Spouse name: Not on file    Number of children: Not on file    Years of education: Not on file    Highest education level: Not on file   Occupational History    Not on file   Tobacco Use    Smoking status: Never Smoker    Smokeless tobacco: Never Used   Substance and Sexual Activity    Alcohol use: No    Drug use: No    Sexual activity: Not on file   Other Topics Concern    Not on file   Social History Narrative    Not on file     Social Determinants of Health     Financial Resource Strain:     Difficulty of Paying Living Expenses: Not on file   Food Insecurity:     Worried About Running Out of Food in the Last Year: Not on file    Errol of Food in the Last Year: Not on file   Transportation Needs:     Lack of Transportation (Medical): Not on file    Lack of Transportation (Non-Medical): Not on file   Physical Activity:     Days of Exercise per Week: Not on file    Minutes of Exercise per Session: Not on file   Stress:     Feeling of Stress : Not on file   Social Connections:     Frequency of Communication with Friends and Family: Not on file    Frequency of Social Gatherings with Friends and Family: Not on file    Attends [employer] Services: Not on file    Active Member of [address] [place] [address] or Organizations: Not on file    Attends Club or Organization Meetings: Not on file    Marital Status: Not on file   Intimate Partner Violence:     Fear of Current or Ex-Partner: Not on file    Emotionally Abused: Not on file    Physically Abused: Not on file    Sexually Abused: Not on file   Housing Stability:     Unable to Pay for Housing in the Last Year: Not on file    Number of [place] in the Last Year: Not on file    Unstable Housing in the Last Year: Not on file     Family History  History reviewed. No pertinent family history.     OBJECTIVE  BP (!) 156/83   Pulse 68   Temp 97.8 °F (36.6 °C) (Oral)   Resp 16   Ht 5' 6\" (1.676 m)   Wt 135 lb (61.2 kg)   SpO2 96%   BMI 21.79 kg/m²     NIH Stroke Scale  Interval: Baseline  Level of Consciousness (1a): Alert  LOC Questions (1b):

## 2022-05-10 NOTE — ED NOTES
[facility]     Kazdo Jose D RN  05/09/22 [id number]

## 2022-05-10 NOTE — ED PROVIDER NOTES
ED  Provider Note  Admit Date/RoomTime: 5/9/2022  9:54 PM  ED Room: 07/07     HPI:   Alis Matthews is a 59 y.o. male presenting to the ED for stroke, beginning hours ago. History comes primarily from EMS and the Medical Record. There is no problem list on file for this patient. .           The complaint has been persistent, moderate in severity, improved by nothing and worsened by nothing. Associated symptoms include none. Williams Matos was mowing the lawn today and became tired, leading him to take a nap at home. He slept from approximately 1400 until 2300 yesterday afternoon into evening, and when he was awoken by his wife he was noted to have significant focal deficits. These deficits include confusion, aphasia, weakness of the right lower extremity and upper extremity, right-sided facial droop. For this reason the patient was brought to [facility] emergency department, however due to the limited capabilities of this location the patient was transported to [facility] after initial assessment in [facility]. Review of Systems   Constitutional: Negative for chills and fever. HENT: Negative for ear pain, sinus pressure and sore throat. Eyes: Negative for pain, discharge and redness. Respiratory: Negative for cough, shortness of breath and wheezing. Cardiovascular: Negative for chest pain. Gastrointestinal: Negative for abdominal pain, diarrhea, nausea and vomiting. Genitourinary: Negative for dysuria and frequency. Musculoskeletal: Negative for arthralgias and back pain. Skin: Negative for rash and wound. Neurological: Positive for facial asymmetry, speech difficulty and weakness. Negative for headaches. Hematological: Negative for adenopathy. All other systems reviewed and are negative. Physical Exam  Vitals and nursing note reviewed. Constitutional:       General: He is not in acute distress.      Appearance: He is well-developed. He is not diaphoretic. HENT:      Head: Normocephalic and atraumatic. Mouth/Throat:      Dentition: Abnormal dentition. Eyes:      Pupils: Pupils are equal, round, and reactive to light. Neck:      Vascular: No JVD. Trachea: No tracheal deviation. Cardiovascular:      Rate and Rhythm: Regular rhythm. Heart sounds: No murmur heard. No friction rub. No gallop. Pulmonary:      Effort: Pulmonary effort is normal. No respiratory distress. Breath sounds: Normal breath sounds. No stridor. No wheezing or rales. Chest:      Chest wall: No tenderness. Abdominal:      General: Bowel sounds are normal. There is no distension. Palpations: Abdomen is soft. Tenderness: There is no abdominal tenderness. There is no guarding. Musculoskeletal:         General: Normal range of motion. Cervical back: Normal range of motion. Skin:     General: Skin is warm and dry. Neurological:      Mental Status: He is oriented to person, place, and time. GCS: GCS eye subscore is 4. GCS verbal subscore is 5. GCS motor subscore is 6. Cranial Nerves: Cranial nerve deficit, dysarthria and facial asymmetry present. Sensory: No sensory deficit. Motor: Weakness present. Comments: See associated NIH   Psychiatric:         Behavior: Behavior normal.          NIH Stroke Scale/Score at time of initial evaluation:  1A: Level of Consciousness 0 - alert; keenly responsive   1B: Ask Month and Age 2 - answers neither question correctly   1C:  Tell Patient To Open and Close Eyes, then Hand  Squeeze 0 - performs both tasks correctly   2: Test Horizontal Extraocular Movements 0 - normal   3: Test Visual Fields 0 - no visual loss   4: Test Facial Palsy 1 - minor paralysis (flattened nasolabial fold, asymmetric on smiling)   5A: Test Left Arm Motor Drift 0 - no drift, limb holds 90 (or 45) degrees for full 10 seconds   5B: Test Right Arm Motor Drift 1 - drift, limb holds 90 (or 45) degrees but drifts down before full 10 seconds: does not hit bed   6A: Test Left Leg Motor Drift 0 - no drift; leg holds 30 degree position for full 5 seconds   6B: Test Right Leg Motor Drift 1 - drift; leg falls by the end of the 5 second period but does not hit bed   7: Test Limb Ataxia   (FNF/Heel-Shin) 2 - present in two limbs   8: Test Sensation 0 - normal; no sensory loss   9: Test Language/Aphasia 2 - severe aphasia; all communication is through fragmentary expression; great need for inference, questioning, and guessing by the listener. Range of information that can be exchanged is limited; listener carries burden of communication. Examiner cannot identify materials provided from patient response. 10: Test Dysarthria 0 - normal   11: Test Extinction/Inattention 0 - no abnormality   Total Score: 9   5/10/22 at 12:02 AM EDT. Acute CVA Core Measures:      - t-PA Eligibility: IV t-PA was considered and not given due to violations in inclusion criteria including stroke onset was greater than 3 hours prior to presentation   - The case has been discussed with Dr. Lisa Vargas, they agree this patient is NOT t-PA eligible. Procedures     MDM  Number of Diagnoses or Management Options  Receptive aphasia  Right sided weakness  Stroke-like symptoms  Diagnosis management comments: Emergency department evaluation was notable for strokelike symptoms with aphasia and right-sided weakness. M2 occlusion of the patient's left MCA as well as loss was suggested on with venous further probing angiography, however on the perfusion study there is no obvious evidence of penumbra or retrievable clot. Patient will be treated with medical management until the morning when MRI can be obtained. Interventional neurology is following and will consider patient is in no respiratory distress any further interventions once those images have been obtained.   Patient needs to be closely observed in the inpatient setting until that time. This information was relayed to the patient who understood this plan of care and was amenable to the plan. Patient was discussed with the admitting service (Dr. Aubrey Rabago) who concurred with the decision for admission, and have agreed to admit the patient to telemetry. ED Course as of 05/10/22 0813   Tue May 10, 2022   0127 Spoke with Dr. Ирина Del Angel of interventional neurology, who r believes that the patient's symptoms are likely secondary to intracranial stenosis as the patient does have findings on angiography and associated clinical findings but no significant penumbra and a very small amount of deficit appreciated on the brain perfusion study. Given the risk-benefit considerations of this finding in the context of intervention on this 25-year-old male, the plan at this point in time is for further imaging with MRI as well as aspirin, Brilinta and fluids. Patient will be reassessed in the morning. [RK]      ED Course User Index  [RK] [facility] 43., DO     ED Course as of 05/10/22 0813   Tue May 10, 2022   0127 Spoke with Dr. Ирина Del Angel of interventional neurology, who r believes that the patient's symptoms are likely secondary to intracranial stenosis as the patient does have findings on angiography and associated clinical findings but no significant penumbra and a very small amount of deficit appreciated on the brain perfusion study. Given the risk-benefit considerations of this finding in the context of intervention on this 25-year-old male, the plan at this point in time is for further imaging with MRI as well as aspirin, Brilinta and fluids. Patient will be reassessed in the morning. [RK]      ED Course User Index  [RK] Kentrell Jordan, DO       --------------------------------------------- PAST HISTORY ---------------------------------------------  Past Medical History:  has a past medical history of Hypertension and Thyroid disease.     Past Surgical History:  has a past surgical history that includes Knee arthroscopy. Social History:  reports that he has never smoked. He has never used smokeless tobacco. He reports that he does not drink alcohol and does not use drugs. Family History: family history is not on file. The patients home medications have been reviewed.     Allergies: Codeine    -------------------------------------------------- RESULTS -------------------------------------------------    LABS:  Results for orders placed or performed during the hospital encounter of 05/09/22   CBC with Auto Differential   Result Value Ref Range    WBC 9.1 4.5 - 11.5 E9/L    RBC 5.05 3.80 - 5.80 E12/L    Hemoglobin 14.9 12.5 - 16.5 g/dL    Hematocrit 42.0 37.0 - 54.0 %    MCV 83.2 80.0 - 99.9 fL    MCH 29.5 26.0 - 35.0 pg    MCHC 35.5 (H) 32.0 - 34.5 %    RDW 12.7 11.5 - 15.0 fL    Platelets 870 478 - 811 E9/L    MPV 9.1 7.0 - 12.0 fL    Neutrophils % 70.3 43.0 - 80.0 %    Immature Granulocytes % 0.3 0.0 - 5.0 %    Lymphocytes % 21.1 20.0 - 42.0 %    Monocytes % 7.0 2.0 - 12.0 %    Eosinophils % 0.9 0.0 - 6.0 %    Basophils % 0.4 0.0 - 2.0 %    Neutrophils Absolute 6.42 1.80 - 7.30 E9/L    Immature Granulocytes # 0.03 E9/L    Lymphocytes Absolute 1.93 1.50 - 4.00 E9/L    Monocytes Absolute 0.64 0.10 - 0.95 E9/L    Eosinophils Absolute 0.08 0.05 - 0.50 E9/L    Basophils Absolute 0.04 0.00 - 0.20 E9/L   Comprehensive Metabolic Panel w/ Reflex to MG   Result Value Ref Range    Sodium 138 132 - 146 mmol/L    Potassium reflex Magnesium 3.4 (L) 3.5 - 5.0 mmol/L    Chloride 99 98 - 107 mmol/L    CO2 23 22 - 29 mmol/L    Anion Gap 16 7 - 16 mmol/L    Glucose 168 (H) 74 - 99 mg/dL    BUN 14 6 - 23 mg/dL    CREATININE 1.4 (H) 0.7 - 1.2 mg/dL    GFR Non-African American >60 >=60 mL/min/1.73    GFR African American >60     Calcium 9.5 8.6 - 10.2 mg/dL    Total Protein 7.9 6.4 - 8.3 g/dL    Albumin 4.2 3.5 - 5.2 g/dL    Total Bilirubin 0.4 0.0 - 1.2 mg/dL    Alkaline Phosphatase 94 40 - 129 U/L ALT 8 0 - 40 U/L    AST 14 0 - 39 U/L   Troponin   Result Value Ref Range    Troponin, High Sensitivity 14 (H) 0 - 11 ng/L   Brain Natriuretic Peptide   Result Value Ref Range    Pro-BNP 29 0 - 125 pg/mL   Urinalysis with Microscopic   Result Value Ref Range    Color, UA Yellow Straw/Yellow    Clarity, UA Clear Clear    Glucose, Ur Negative Negative mg/dL    Bilirubin Urine Negative Negative    Ketones, Urine TRACE (A) Negative mg/dL    Specific Gravity, UA <=1.005 1.005 - 1.030    Blood, Urine LARGE (A) Negative    pH, UA 6.0 5.0 - 9.0    Protein, UA Negative Negative mg/dL    Urobilinogen, Urine 0.2 <2.0 E.U./dL    Nitrite, Urine Negative Negative    Leukocyte Esterase, Urine Negative Negative    WBC, UA NONE 0 - 5 /HPF    RBC, UA >20 0 - 2 /HPF    Bacteria, UA NONE SEEN None Seen /HPF   Protime-INR   Result Value Ref Range    Protime 12.6 (H) 9.3 - 12.4 sec    INR 1.1    APTT   Result Value Ref Range    aPTT 33.7 24.5 - 35.1 sec   Lactic Acid   Result Value Ref Range    Lactic Acid 1.8 0.5 - 2.2 mmol/L   URINE DRUG SCREEN   Result Value Ref Range    Amphetamine Screen, Urine NOT DETECTED Negative <1000 ng/mL    Barbiturate Screen, Ur NOT DETECTED Negative < 200 ng/mL    Benzodiazepine Screen, Urine NOT DETECTED Negative < 200 ng/mL    Cannabinoid Scrn, Ur NOT DETECTED Negative < 50ng/mL    Cocaine Metabolite Screen, Urine NOT DETECTED Negative < 300 ng/mL    Opiate Scrn, Ur NOT DETECTED Negative < 300ng/mL    PCP Screen, Urine NOT DETECTED Negative < 25 ng/mL    Methadone Screen, Urine NOT DETECTED Negative <300 ng/mL    Oxycodone Urine POSITIVE (A) Negative <100 ng/mL    FENTANYL SCREEN, URINE NOT DETECTED Negative <1 ng/mL    Drug Screen Comment: see below    Serum Drug Screen   Result Value Ref Range    Ethanol Lvl <10 mg/dL    Acetaminophen Level <5.0 (L) 10.0 - 05.2 mcg/mL    Salicylate, Serum <2.1 0.0 - 30.0 mg/dL   Magnesium   Result Value Ref Range    Magnesium 2.1 1.6 - 2.6 mg/dL   CBC   Result Value Ref Range    WBC 6.9 4.5 - 11.5 E9/L    RBC 4.80 3.80 - 5.80 E12/L    Hemoglobin 14.1 12.5 - 16.5 g/dL    Hematocrit 41.3 37.0 - 54.0 %    MCV 86.0 80.0 - 99.9 fL    MCH 29.4 26.0 - 35.0 pg    MCHC 34.1 32.0 - 34.5 %    RDW 12.7 11.5 - 15.0 fL    Platelets 690 791 - 483 E9/L    MPV 9.1 7.0 - 12.0 fL   Hemoglobin A1c   Result Value Ref Range    Hemoglobin A1C 5.8 (H) 4.0 - 5.6 %   Lipid panel - fasting   Result Value Ref Range    Cholesterol, Total 172 0 - 199 mg/dL    Triglycerides 34 0 - 149 mg/dL    HDL 60 >40 mg/dL    LDL Calculated 105 (H) 0 - 99 mg/dL    VLDL Cholesterol Calculated 7 mg/dL   Basic Metabolic Panel w/ Reflex to MG   Result Value Ref Range    Sodium 145 132 - 146 mmol/L    Potassium reflex Magnesium 3.6 3.5 - 5.0 mmol/L    Chloride 105 98 - 107 mmol/L    CO2 31 (H) 22 - 29 mmol/L    Anion Gap 9 7 - 16 mmol/L    Glucose 130 (H) 74 - 99 mg/dL    BUN 11 6 - 23 mg/dL    CREATININE 1.1 0.7 - 1.2 mg/dL    GFR Non-African American >60 >=60 mL/min/1.73    GFR African American >60     Calcium 9.4 8.6 - 10.2 mg/dL   POCT Glucose   Result Value Ref Range    Meter Glucose 159 (H) 74 - 99 mg/dL   EKG 12 Lead   Result Value Ref Range    Ventricular Rate 67 BPM    Atrial Rate 67 BPM    P-R Interval 212 ms    QRS Duration 88 ms    Q-T Interval 422 ms    QTc Calculation (Bazett) 445 ms    P Axis 67 degrees    R Axis 54 degrees    T Axis 87 degrees       RADIOLOGY:  CT BRAIN PERFUSION   Final Result   1. No significant perfusion mismatch. CTA HEAD W CONTRAST   Final Result   Findings suspicious for thrombus within the left M branch of the MCA, as well   as a in insular branch of the left MCA. Age-indeterminate parenchymal hypodensities within the left cerebral   hemisphere as above, which raise suspicion for acute, nonhemorrhagic   infarction. These findings were discussed with Dr. Martha Thurston at 11:31 p.m. on 05/09/2022.          CTA NECK W CONTRAST   Final Result   Findings suspicious for thrombus within the left M branch of the MCA, as well   as a in insular branch of the left MCA. Age-indeterminate parenchymal hypodensities within the left cerebral   hemisphere as above, which raise suspicion for acute, nonhemorrhagic   infarction. These findings were discussed with Dr. Ivon Bo at 11:31 p.m. on 05/09/2022. XR CHEST PORTABLE   Final Result   No acute cardiopulmonary process. MRI BRAIN WO CONTRAST    (Results Pending)             ------------------------- NURSING NOTES AND VITALS REVIEWED ---------------------------  Date / Time Roomed:  5/9/2022  9:54 PM  ED Bed Assignment:  [id number]    The nursing notes within the ED encounter and vital signs as below have been reviewed. Patient Vitals for the past 24 hrs:   BP Temp Temp src Pulse Resp SpO2 Height Weight   05/10/22 0640 136/77 -- -- (!) 47 11 97 % -- --   05/10/22 0408 -- -- -- -- -- -- -- 143 lb 9.6 oz (65.1 kg)   05/10/22 0330 (!) 142/76 98.4 °F (36.9 °C) Oral 56 15 96 % -- --   05/10/22 0300 (!) 144/80 -- -- 57 14 96 % -- --   05/10/22 0230 (!) 151/111 -- -- 58 12 97 % -- --   05/10/22 0200 (!) 155/82 -- -- 58 12 96 % -- --   05/10/22 0130 (!) 148/90 -- -- 54 14 96 % -- --   05/10/22 0100 (!) 164/93 -- -- 60 11 95 % -- --   05/10/22 0045 (!) 168/88 -- -- 69 19 97 % -- --   05/10/22 0000 (!) 157/131 -- -- 61 17 100 % -- --   05/09/22 2327 (!) 180/90 -- -- 70 16 100 % -- --   05/09/22 2156 (!) 156/83 97.8 °F (36.6 °C) Oral 68 16 96 % 5' 6\" (1.676 m) 135 lb (61.2 kg)       Oxygen Saturation Interpretation: Normal    ------------------------------------------ PROGRESS NOTES ------------------------------------------  Re-evaluation(s):  Time: 2:16 AM EDT  Patients symptoms show no change  Repeat physical examination is not changed    Counseling:  I have spoken with the patient and discussed todays results, in addition to providing specific details for the plan of care and counseling regarding the diagnosis and prognosis.   Their questions are answered at this time and they are agreeable with the plan of admission.    --------------------------------- ADDITIONAL PROVIDER NOTES ---------------------------------  Consultations:  Time: 2:16 AM EDT. Spoke with Dr. Smiley Winkler. Discussed case. They will admit the patient. This patient's ED course included: a personal history and physicial examination, re-evaluation prior to disposition, multiple bedside re-evaluations, continuous pulse oximetry and complex medical decision making and emergency management    This patient has remained hemodynamically stable during their ED course. Diagnosis:  1. Receptive aphasia    2. Right sided weakness    3. Stroke-like symptoms        Disposition:  Patient's disposition: Admit to telemetry  Patient's condition is fair. Kentrell Bowman DO  Resident  05/10/22 0216  ATTENDING PROVIDER ATTESTATION:     I have personally performed and/or participated in the history, exam, medical decision making, and procedures and agree with all pertinent clinical information. I have also reviewed and agree with the past medical, family and social history unless otherwise noted. I have discussed this patient in detail with the resident, and provided the instruction and education regarding stroke symptoms. My findings/Plan: I was the primary provider for patient. Patient presenting here because of stroke symptoms. Patient reportedly was mowing lawn and then became tired he went to take a nap. He woke up and he was noted to be weak and confused patient having expressive aphasia. Patient was noted to be weak in his right upper and lower extremity he was seen at outlying facility and transferred here for further evaluation and treatment. Patient awake alert. Patient is not oriented. Patient has noted deficits on the right side. Patient has some facial droop he has some expressive aphasia. Lungs are clear abdomen is soft and nontender.   Patient has normal strength on the left side. Patient labs from outlying facility noted reviewed as well as CTs from outlying facility noted reviewed CT perfusion was done here showed no acute findings. We did speak to interventional neuroradiologist due to the fact that the patient was outside the window for tPA. Patient is not a candidate for tPA and currently not candidate for any thrombectomy. Patient unchanged here. Vital signs noted. Patient unable to swallow medications here due to failure of swallow study. Patient will be admitted to intermediate bed we did speak to on-call internal medicine. Patient will be admitted to monitored bed.        Ludivina Soni MD  05/10/22 [address] Dayami Figueroa MD  05/10/22 0813       [facility] 43., DO  Resident  05/15/22 [id number]    ATTENDING PROVIDER ATTESTATION:     Supervising Physician, on-site, available for consultation, non-participatory in the evaluation or care of this patient       Ludivina Soni MD  05/16/22 1293

## 2022-05-10 NOTE — ED NOTES
Dr. Melquiades Green, neurologist speaking with Dr. Cuba Serrano again     Lamine Wiggins, RN  05/09/22 497 Daniel Mcnulty RN  05/09/22 [id number]

## 2022-05-10 NOTE — CARE COORDINATION
5/10/2022 - Care Coordination - admitted for stroke-like symptoms. Neurostroke and neurology consults. For CT head and MRI brain today. Alert and oriented to self and time. Follows commands. PT/OT/SLP evals ordered. Spoke withpt wife to discuss discharge planning. PCP is Dr Shayy Whittaker on [address] [facility] is [facility]. Lives with his wife in a 2 story home with 4 steps into the home and bed/bath located on 2nd floor. Also bath on 1st floor. Prior to admission, pt is retired from [employer] and independent in ADLs. Denies hx HHC, JACKELINE/ARU and DME. Will rely on evals and recommendations to determine appropriate discharge plan. SW/CM will follow.

## 2022-05-11 PROBLEM — I63.9 ACUTE ISCHEMIC STROKE (HCC): Status: ACTIVE | Noted: 2022-05-10

## 2022-05-11 LAB
ANION GAP SERPL CALCULATED.3IONS-SCNC: 13 MMOL/L (ref 7–16)
BUN BLDV-MCNC: 11 MG/DL (ref 6–23)
CALCIUM SERPL-MCNC: 9.2 MG/DL (ref 8.6–10.2)
CHLORIDE BLD-SCNC: 102 MMOL/L (ref 98–107)
CO2: 26 MMOL/L (ref 22–29)
CREAT SERPL-MCNC: 0.9 MG/DL (ref 0.7–1.2)
GFR AFRICAN AMERICAN: >60
GFR NON-AFRICAN AMERICAN: >60 ML/MIN/1.73
GLUCOSE BLD-MCNC: 144 MG/DL (ref 74–99)
HCT VFR BLD CALC: 39.9 % (ref 37–54)
HEMOGLOBIN: 13.5 G/DL (ref 12.5–16.5)
MAGNESIUM: 2 MG/DL (ref 1.6–2.6)
MCH RBC QN AUTO: 29 PG (ref 26–35)
MCHC RBC AUTO-ENTMCNC: 33.8 % (ref 32–34.5)
MCV RBC AUTO: 85.8 FL (ref 80–99.9)
PDW BLD-RTO: 12.7 FL (ref 11.5–15)
PLATELET # BLD: 361 E9/L (ref 130–450)
PMV BLD AUTO: 8.9 FL (ref 7–12)
POTASSIUM REFLEX MAGNESIUM: 2.9 MMOL/L (ref 3.5–5)
RBC # BLD: 4.65 E12/L (ref 3.8–5.8)
SODIUM BLD-SCNC: 141 MMOL/L (ref 132–146)
WBC # BLD: 7.4 E9/L (ref 4.5–11.5)

## 2022-05-11 PROCEDURE — 6360000002 HC RX W HCPCS: Performed by: FAMILY MEDICINE

## 2022-05-11 PROCEDURE — 85027 COMPLETE CBC AUTOMATED: CPT

## 2022-05-11 PROCEDURE — 84132 ASSAY OF SERUM POTASSIUM: CPT

## 2022-05-11 PROCEDURE — 2060000000 HC ICU INTERMEDIATE R&B

## 2022-05-11 PROCEDURE — 6370000000 HC RX 637 (ALT 250 FOR IP): Performed by: FAMILY MEDICINE

## 2022-05-11 PROCEDURE — 80048 BASIC METABOLIC PNL TOTAL CA: CPT

## 2022-05-11 PROCEDURE — 83735 ASSAY OF MAGNESIUM: CPT

## 2022-05-11 PROCEDURE — 99233 SBSQ HOSP IP/OBS HIGH 50: CPT | Performed by: PHYSICIAN ASSISTANT

## 2022-05-11 PROCEDURE — 97530 THERAPEUTIC ACTIVITIES: CPT

## 2022-05-11 RX ORDER — POTASSIUM CHLORIDE 20 MEQ/1
40 TABLET, EXTENDED RELEASE ORAL PRN
Status: DISCONTINUED | OUTPATIENT
Start: 2022-05-11 | End: 2022-05-13 | Stop reason: HOSPADM

## 2022-05-11 RX ORDER — POTASSIUM CHLORIDE 20 MEQ/1
20 TABLET, EXTENDED RELEASE ORAL
Status: DISCONTINUED | OUTPATIENT
Start: 2022-05-12 | End: 2022-05-13 | Stop reason: HOSPADM

## 2022-05-11 RX ORDER — LISINOPRIL 20 MG/1
20 TABLET ORAL DAILY
Status: DISCONTINUED | OUTPATIENT
Start: 2022-05-12 | End: 2022-05-13 | Stop reason: HOSPADM

## 2022-05-11 RX ORDER — POTASSIUM CHLORIDE 7.45 MG/ML
10 INJECTION INTRAVENOUS PRN
Status: DISCONTINUED | OUTPATIENT
Start: 2022-05-11 | End: 2022-05-13 | Stop reason: HOSPADM

## 2022-05-11 RX ADMIN — POTASSIUM CHLORIDE 10 MEQ: 7.46 INJECTION, SOLUTION INTRAVENOUS at 20:11

## 2022-05-11 RX ADMIN — POTASSIUM CHLORIDE 10 MEQ: 7.46 INJECTION, SOLUTION INTRAVENOUS at 05:27

## 2022-05-11 RX ADMIN — POTASSIUM CHLORIDE 10 MEQ: 7.46 INJECTION, SOLUTION INTRAVENOUS at 21:16

## 2022-05-11 RX ADMIN — POTASSIUM CHLORIDE 10 MEQ: 7.46 INJECTION, SOLUTION INTRAVENOUS at 06:56

## 2022-05-11 RX ADMIN — AMLODIPINE BESYLATE 10 MG: 10 TABLET ORAL at 08:44

## 2022-05-11 RX ADMIN — LEVOTHYROXINE SODIUM 88 MCG: 0.09 TABLET ORAL at 06:56

## 2022-05-11 RX ADMIN — LISINOPRIL 10 MG: 10 TABLET ORAL at 08:44

## 2022-05-11 RX ADMIN — ASPIRIN 81 MG 81 MG: 81 TABLET ORAL at 11:50

## 2022-05-11 RX ADMIN — POTASSIUM CHLORIDE 10 MEQ: 7.46 INJECTION, SOLUTION INTRAVENOUS at 11:49

## 2022-05-11 RX ADMIN — ATORVASTATIN CALCIUM 40 MG: 40 TABLET, FILM COATED ORAL at 20:10

## 2022-05-11 RX ADMIN — POTASSIUM CHLORIDE 10 MEQ: 7.46 INJECTION, SOLUTION INTRAVENOUS at 09:25

## 2022-05-11 NOTE — CARE COORDINATION
5/11/2022 - Updated CM note - admitted for stroke-like symptoms. Neuro stroke following. Neurology consult. CT head to assess hemorrhage in left occipital lobe. Echo ordered. PT 18/24, OT 15/24 yesterday. Will rely on evals and recommendations to determine appropriate discharge plan. If discharged home, wife will provide transportation home. SW/CM will follow.

## 2022-05-11 NOTE — PROGRESS NOTES
Hospitalist Progress Note      Synopsis: Patient admitted on 5/9/2022 59y.o. year old male  who  has a past medical history of Hypertension and Thyroid disease.      Patient presented to the emergency department with strokelike symptoms. Patient was mowing the lawn when he became tired. He took a nap sleeping from 2:00 until 11:00 at night. When he woke up his wife noted that he was confused, having trouble speaking and weakness of the right lower and right upper extremity. He also had right-sided facial droop. Vital signs within normal limits and stable. Laboratory studies were unremarkable. NIH of 9. Imaging revealed no LVO. Intracranial stenosis of the left MCA. Cerebral perfusion imaging demonstrates a very tiny penumbra of 3 cc in the watershed territory and mild hypoperfusion in the watershed territory. Patient was outside the window for tPA. Telestroke was consulted. Advised aspirin, Brilinta. MRI of the brain in the morning. Past swallow diet advanced. MRI with multiple left-sided infarcts. Concern for hemorrhagic conversion. Subjective    Clinically improving. Feeling better. Stable overnight. No other overnight issues reported. No CP, SOB, palpitations, blurred vision, HA, lightheadedness, LOC or focal neurological deficits    Exam:  BP (!) 177/94   Pulse 63   Temp 97.9 °F (36.6 °C) (Oral)   Resp 16   Ht 5' 6\" (1.676 m)   Wt 147 lb 12.8 oz (67 kg)   SpO2 98%   BMI 23.86 kg/m²   General appearance: No apparent distress, appears stated age and cooperative. HEENT: Pupils equal, round, and reactive to light. Conjunctivae/corneas clear. Neck: Supple. No jugular venous distention. Trachea midline. Respiratory:  Normal respiratory effort. Clear to auscultation, bilaterally without Rales/Wheezes/Rhonchi. Cardiovascular: Regular rate and rhythm with normal S1/S2 without murmurs, rubs or gallops.   Abdomen: Soft, non-tender, non-distended with normal bowel sounds. Musculoskeletal: No clubbing, cyanosis or edema bilaterally. Brisk capillary refill. 2+ lower extremity pulses (dorsalis pedis). Skin:  No rashes    Neurologic: awake, alert and following commands     Medications:  Reviewed    Infusion Medications   Scheduled Medications    [Held by provider] enoxaparin  40 mg SubCUTAneous Daily    amLODIPine  10 mg Oral Daily    levothyroxine  88 mcg Oral Daily    lisinopril  10 mg Oral Daily    [Held by provider] aspirin  81 mg Oral Daily    atorvastatin  40 mg Oral Nightly    [Held by provider] ticagrelor  90 mg Oral BID     PRN Meds: potassium chloride **OR** potassium alternative oral replacement **OR** potassium chloride, perflutren lipid microspheres, ondansetron **OR** ondansetron, polyethylene glycol    I/O    Intake/Output Summary (Last 24 hours) at 5/11/2022 0936  Last data filed at 5/11/2022 0800  Gross per 24 hour   Intake 1731.33 ml   Output 2100 ml   Net -368.67 ml       Labs:   Recent Labs     05/09/22  2215 05/10/22  0535 05/11/22  0431   WBC 9.1 6.9 7.4   HGB 14.9 14.1 13.5   HCT 42.0 41.3 39.9    356 361       Recent Labs     05/09/22  2215 05/10/22  0535 05/11/22  0431    145 141   K 3.4* 3.6 2.9*   CL 99 105 102   CO2 23 31* 26   BUN 14 11 11   CREATININE 1.4* 1.1 0.9   CALCIUM 9.5 9.4 9.2       Recent Labs     05/09/22 2215   PROT 7.9   ALKPHOS 94   ALT 8   AST 14   BILITOT 0.4       Recent Labs     05/09/22 2216   INR 1.1       No results for input(s): Carli Comber in the last 72 hours. Chronic labs:  Lab Results   Component Value Date    CHOL 172 05/10/2022    TRIG 34 05/10/2022    HDL 60 05/10/2022    LDLCALC 105 (H) 05/10/2022    TSH 2.870 05/10/2022    PSA 4.16 (H) 01/04/2022    INR 1.1 05/09/2022    LABA1C 5.8 (H) 05/10/2022       Radiology:  Imaging studies reviewed today. MRI brain  1.  Multiple foci of acute or early subacute infarctions in the left cerebral   hemisphere, most notably in the left occipital region. 2. Small hemorrhage is seen within the infarction in the left occipital lobe. 3. Chronic infarction in the left basal ganglia and right cerebellar   hemisphere. ASSESSMENT:    Principal Problem:    Stroke-like symptoms  Active Problems:    HTN (hypertension)    Hypothyroidism  Resolved Problems:    * No resolved hospital problems. *       PLAN:    Admitted to neuro ICU for acute stroke  asa/Brilinta-held due to concern for hemorrhagic conversion  Statin  MRI brain demonstrating multiple acute left-sided infarcts as well as concern for small left occipital hemorrhage  Follow-up CT head ordered to evaluate hemorrhagic conversion  CTA head neck-concern for thrombus in the left MCA, no significant carotid stenosis  Embolic work-up  Echo  Medications for other co morbidities cont as appropriate w dosage adjustments as necessary   PT/OT  D/C planning      Diet: ADULT DIET; Regular; No Added Salt (3-4 gm)  Code Status: Full Code  PT/OT Eval Status:   Ordered  DVT Prophylaxis:   SCDs  Recommended disposition at discharge:   TBD likely home    +++++++++++++++++++++++++++++++++++++++++++++++++  Shauna Houston MD   [facility].  +++++++++++++++++++++++++++++++++++++++++++++++++  NOTE: This report was transcribed using voice recognition software. Every effort was made to ensure accuracy; however, inadvertent computerized transcription errors may be present.

## 2022-05-11 NOTE — PROGRESS NOTES
Physical Therapy    Physical Therapy Treatment Note    Name: Larry Olmedo  : 1960  MRN: [id number]      Date of Service: 2022    Evaluating PT:  Claudia Mendiola, PT, DPT  [id number]     Room #:  4899/1093-A  Diagnosis:  Receptive aphasia [R47.01]  Right sided weakness [R53.1]  Stroke-like symptoms [R29.90]  PMHx/PSHx:  HTN, thyroid disease   Procedure/Surgery:  NA  Precautions:  Falls, Aphasia, Cognition, RUE weakness, R visual field deficit   Equipment Needs:  TBD    SUBJECTIVE:    Pt lives with his wife in a 2 story home with 3 stairs and 1 rail to enter. Bedroom and bathroom are on the 2nd level with full flight and 1 rail. Pt ambulated with no AD PTA. OBJECTIVE:   Initial Evaluation  Date: 5/10/22 Treatment  22 Short Term/ Long Term   Goals   AM-PAC 6 Clicks  65/75    Was pt agreeable to Eval/treatment? Yes  Yes     Does pt have pain? No c/o pain  No c/o pain No c/o pain     Bed Mobility  Rolling: SBA  Supine to sit: SBA  Sit to supine: NT  Scooting: SBA Rolling: NT  Supine to sit: SBA  Sit to supine: NT  Scooting: SBA Rolling: Independent   Supine to sit: Independent   Sit to supine: Independent   Scooting: Independent    Transfers Sit to stand: CGA  Stand to sit: CGA  Stand pivot: CGA Sit to stand: SBA  Stand to sit: SBA  Stand pivot: SBA no device Sit to stand: Independent   Stand to sit:  Independent   Stand pivot: Independent    Ambulation    350 feet with no AD  feet x 3 reps with SBA no device >500 feet with no AD Independent    Stair negotiation: ascended and descended  12 steps with 1 rail Min A 12 steps with 1 rail and SBA >16 steps with 1 rail Modified Independent     ROM BUE:  Per OT eval   BLE:  WFL     Strength BUE:  Per OT eval   BLE:  5/5     Balance Sitting EOB:  SBA  Dynamic Standing:  CGA<>Min A Sitting EOB:  Independent  Dynamic Standing:  SBA no device Sitting EOB:  Independent   Dynamic Standing:  Modified Independent       Pt is A & O x 4  RASS:  0  CAM-ICU: NT  Sensation:  Pt denies numbness and tingling to extremities  Edema:  Unremarkable  Coordination:  No deficit noted with heel to shin or AZAM of LE    Vitals:  Heart Rate at rest 71 bpm Heart Rate post session 56 bpm   SpO2 at rest 100% SpO2 post session 99%   Blood Pressure at rest 155/85 mmHg Blood Pressure post session 166/101 mmHg       Functional Status Score-Intensive Care Unit (FSS-ICU)   Rolling -/7   Supine to sit transfer 5/7   Unsupported sitting  7/7   Sit to stand transfers 5/7   Ambulation 5/7   Total  22/35     Therapeutic Exercises:    BLE ROM    Patient education  Pt educated on PT role, safety during functional mobility    Patient response to education:   Pt verbalized understanding Pt demonstrated skill Pt requires further education in this area   Yes  Yes  Reinforce      ASSESSMENT:    Conditions Requiring Skilled Therapeutic Intervention:    [x]Decreased strength     []Decreased ROM  [x]Decreased functional mobility  [x]Decreased balance   [x]Decreased endurance   []Decreased posture  []Decreased sensation  []Decreased coordination   []Decreased vision  [x]Decreased safety awareness   []Increased pain       Comments:  Pt was in bed upon arrival, agreeable to treatment session. Pt demonstrated improved mobility and tolerance to activity. Mild aphasia still noted and visual deficits. Occasional minor LOBs but pt was able to self correct. Reciprocal pattern used for stair negotiation. Upon return to room, pt requested to use bathroom. Pt was left in chair with all needs met and call light in reach. Chair alarm on.   Will add gait team.     Treatment:  Patient practiced and was instructed in the following treatment:     Bed mobility training - pt given verbal and tactile cues to facilitate proper sequencing and safety during rolling and supine>sit   STS and pivot transfer training - pt educated on proper hand and foot placement, safety and sequencing, and use of no AD to safely complete sit<>stand and pivot transfers    Gait training- pt was given verbal cues to facilitate safety/balance during ambulation  · Stair training - Pt educated on proper safety and sequencing during stair ascension and descension. Verbal cues were given to facilitate safety/balance     PLAN:    Patient is making good progress towards established goals. Will continue with current POC.       Time in  0815  Time out  0840    Total Treatment Time  25 minutes     CPT codes:  [] Gait training 63082 - minutes  [] Manual therapy 29002 - minutes  [x] Therapeutic activities 71080 25 minutes  [] Therapeutic exercises 52499 - minutes  [] Neuromuscular reeducation 11048 - minutes    Mary Mccarthy, PT, DPT  [id number]

## 2022-05-11 NOTE — PROGRESS NOTES
[facility]  Neurology follow-up    Date:  5/11/2022  Patient Name:  Denilson Hall  YOB: 1960  MRN: [id number]     PCP:  Ezekiel Tyler MD   Referring:  No ref. provider found      Assessment  Multiple areas of acute ischemic stroke in the left hemisphere in both MCA and PCA territories. Vessel imaging suggestive of thrombus in the left MCA. Due to presence in PCA concern for cardioembolic etiology. There is also evidence of right cerebellar stroke. There is some small hemorrhagic conversion in areas of infarct in the left occipital lobe. Plan  · Okay to continue aspirin 81 mg  · Continue to hold Brilinta  · We will recheck CT head in the morning to assess stability  · Continue statin with goal LDL less than 70  · Echo with bubble, if negative will need event monitoring  · PT OT speech  · SCDs  · Stroke education    History of Present Illness:  Denilson Hall is a 59 y.o.  male presenting for evaluation of stroke. He presented with right-sided weakness and speech difficulties. NIH was 9 on arrival.  /83. CTA of the head and neck revealed findings suspicious for thrombus within the left M2 branch of the left MCA. He was outside the window for tPA. Changes were seen on the CT so he is not a great wake-up candidate. There was no LVO and no significant perfusion deficit to justify emergent thrombectomy. He was placed on aspirin and loaded with Brilinta 180 mg followed by 90 mg twice daily. MRI of the brain performed this morning reveals scattered infarcts in the left hemisphere in both the MCA and PCA territories. There is some hemorrhagic conversion of stroke in the occipital lobe. Antiplatelets were held. There is also evidence of a remote infarct in the right cerebellum. This morning he states that he continues to have some weakness in the right hand. He feels this is improving.   Denies any loss of vision or double vision but feels that when he is reading the words appear long. Denies any speech or swallowing difficulties. No new neuro symptoms. No family at bedside    No chest pain or palpitations  No SOB  No vertigo, lightheadedness or loss of consciousness  No falls, tripping or stumbling  No incontinence of bowels or bladder  No itching or bruising appreciated  No numbness, tingling or focal arm/leg weakness  + right hand weakness     ROS otherwise negative       Allergies: Allergies   Allergen Reactions    Codeine Nausea And Vomiting        Physical Examination  Vitals   Vitals:    05/11/22 0600 05/11/22 0700 05/11/22 0800 05/11/22 0900   BP: 139/78 (!) 164/87 (!) 155/85 (!) 177/94   Pulse: 50 (!) 45 58 63   Resp: 16  16    Temp:   97.9 °F (36.6 °C)    TempSrc:   Oral    SpO2: 96% 97% 96% 98%   Weight:       Height:            General: Patient appears in no acute distress. Awake and oriented x 3. HEENT: Normocephalic, atraumatic  Chest: Clear to auscultation bilaterally  Heart: No murmurs appreciated  Extremities/Peripheral vascular: No edema/swelling noted. No cold limbs noted. Neurologic Examination    Mental Status  Alert, and oriented to person, place and time. Speech is fluent with intact comprehension. No evidence of memory impairment. Attention and concentration appeared normal.     Cranial Nerves  II. Visual fields full to confrontation bilaterally. III, IV, VI: Pupils equally round and reactive to light, 3 to 2 mm bilaterally. EOMs: full, no nystagmus. V. Facial sensation intact to light touch bilaterally  VII: Facial movements symmetric and strong  VIII: Hearing intact to voice  IX,X: Palate elevates symmetrically.  No dysarthria  XI: Sternocleidomastoid and trapezius 5/5 bilaterally   XII: Tongue is midline    Motor     Right Left   Right Left   Deltoid 5 5  Hip Flexion 5 5   Biceps      5  5  Knee Extension 5 5   Triceps 5 5  Knee Flexion 5 5   Handgrip 4 5  Ankle Dorsiflexion 5 5       Ankle Plantarflexion 5 5 the infarction in the left occipital lobe. 3. Chronic infarction in the left basal ganglia and right cerebellar   hemisphere. RECOMMENDATIONS:   Unavailable         CT BRAIN PERFUSION   Final Result   1. No significant perfusion mismatch. CTA HEAD W CONTRAST   Final Result   Findings suspicious for thrombus within the left M branch of the MCA, as well   as a in insular branch of the left MCA. Age-indeterminate parenchymal hypodensities within the left cerebral   hemisphere as above, which raise suspicion for acute, nonhemorrhagic   infarction. These findings were discussed with Dr. Gloria Billy at 11:31 p.m. on 05/09/2022. CTA NECK W CONTRAST   Final Result   Findings suspicious for thrombus within the left M branch of the MCA, as well   as a in insular branch of the left MCA. Age-indeterminate parenchymal hypodensities within the left cerebral   hemisphere as above, which raise suspicion for acute, nonhemorrhagic   infarction. These findings were discussed with Dr. Gloria Billy at 11:31 p.m. on 05/09/2022. XR CHEST PORTABLE   Final Result   No acute cardiopulmonary process.                I have personally reviewed the following images: MRI brain     Electronically signed by MORRIS Arias on 5/11/2022 at 10:25 AM

## 2022-05-11 NOTE — PLAN OF CARE
Problem: Discharge Planning  Goal: Discharge to home or other facility with appropriate resources  Outcome: Progressing  Flowsheets (Taken 5/10/2022 2000)  Discharge to home or other facility with appropriate resources:   Identify barriers to discharge with patient and caregiver   Arrange for needed discharge resources and transportation as appropriate   Identify discharge learning needs (meds, wound care, etc)   Refer to discharge planning if patient needs post-hospital services based on physician order or complex needs related to functional status, cognitive ability or social support system     Problem: ABCDS Injury Assessment  Goal: Absence of physical injury  Outcome: Progressing  Flowsheets (Taken 5/10/2022 2100)  Absence of Physical Injury: Implement safety measures based on patient assessment     Problem: Safety - Adult  Goal: Free from fall injury  Outcome: Progressing     Plan of care reviewed with pt and family, as available.

## 2022-05-11 NOTE — PROGRESS NOTES
Patient discharged via wheelchair. IV d/c and left with all personal belongings, rx and d/c information given to wife and they left in personal vehicle.

## 2022-05-12 ENCOUNTER — APPOINTMENT (OUTPATIENT)
Dept: CT IMAGING | Age: 62
DRG: 064 | End: 2022-05-12
Payer: COMMERCIAL

## 2022-05-12 PROBLEM — E87.6 HYPOKALEMIA: Status: ACTIVE | Noted: 2022-05-12

## 2022-05-12 LAB
ANION GAP SERPL CALCULATED.3IONS-SCNC: 12 MMOL/L (ref 7–16)
BUN BLDV-MCNC: 12 MG/DL (ref 6–23)
CALCIUM SERPL-MCNC: 9.1 MG/DL (ref 8.6–10.2)
CHLORIDE BLD-SCNC: 102 MMOL/L (ref 98–107)
CO2: 26 MMOL/L (ref 22–29)
CREAT SERPL-MCNC: 1 MG/DL (ref 0.7–1.2)
GFR AFRICAN AMERICAN: >60
GFR NON-AFRICAN AMERICAN: >60 ML/MIN/1.73
GLUCOSE BLD-MCNC: 111 MG/DL (ref 74–99)
HCT VFR BLD CALC: 39 % (ref 37–54)
HEMOGLOBIN: 13.6 G/DL (ref 12.5–16.5)
LV EF: 60 %
LVEF MODALITY: NORMAL
MAGNESIUM: 1.9 MG/DL (ref 1.6–2.6)
MCH RBC QN AUTO: 29.3 PG (ref 26–35)
MCHC RBC AUTO-ENTMCNC: 34.9 % (ref 32–34.5)
MCV RBC AUTO: 84.1 FL (ref 80–99.9)
PDW BLD-RTO: 12.7 FL (ref 11.5–15)
PLATELET # BLD: 365 E9/L (ref 130–450)
PMV BLD AUTO: 9 FL (ref 7–12)
POTASSIUM REFLEX MAGNESIUM: 3.2 MMOL/L (ref 3.5–5)
POTASSIUM SERPL-SCNC: 3.1 MMOL/L (ref 3.5–5)
RBC # BLD: 4.64 E12/L (ref 3.8–5.8)
SODIUM BLD-SCNC: 140 MMOL/L (ref 132–146)
URINE CULTURE, ROUTINE: NORMAL
WBC # BLD: 6.8 E9/L (ref 4.5–11.5)

## 2022-05-12 PROCEDURE — 83735 ASSAY OF MAGNESIUM: CPT

## 2022-05-12 PROCEDURE — 93306 TTE W/DOPPLER COMPLETE: CPT

## 2022-05-12 PROCEDURE — 80048 BASIC METABOLIC PNL TOTAL CA: CPT

## 2022-05-12 PROCEDURE — 99232 SBSQ HOSP IP/OBS MODERATE 35: CPT | Performed by: PHYSICIAN ASSISTANT

## 2022-05-12 PROCEDURE — 6370000000 HC RX 637 (ALT 250 FOR IP): Performed by: FAMILY MEDICINE

## 2022-05-12 PROCEDURE — 6370000000 HC RX 637 (ALT 250 FOR IP): Performed by: INTERNAL MEDICINE

## 2022-05-12 PROCEDURE — 2060000000 HC ICU INTERMEDIATE R&B

## 2022-05-12 PROCEDURE — 85027 COMPLETE CBC AUTOMATED: CPT

## 2022-05-12 PROCEDURE — 6360000002 HC RX W HCPCS: Performed by: PHYSICIAN ASSISTANT

## 2022-05-12 PROCEDURE — 70450 CT HEAD/BRAIN W/O DYE: CPT

## 2022-05-12 RX ORDER — ASPIRIN 81 MG/1
81 TABLET, CHEWABLE ORAL DAILY
Qty: 30 TABLET | Refills: 3 | Status: SHIPPED | OUTPATIENT
Start: 2022-05-13

## 2022-05-12 RX ORDER — ENOXAPARIN SODIUM 100 MG/ML
40 INJECTION SUBCUTANEOUS ONCE
Status: COMPLETED | OUTPATIENT
Start: 2022-05-12 | End: 2022-05-12

## 2022-05-12 RX ORDER — ATORVASTATIN CALCIUM 40 MG/1
40 TABLET, FILM COATED ORAL NIGHTLY
Qty: 30 TABLET | Refills: 3 | Status: SHIPPED | OUTPATIENT
Start: 2022-05-12

## 2022-05-12 RX ADMIN — LEVOTHYROXINE SODIUM 88 MCG: 0.09 TABLET ORAL at 05:48

## 2022-05-12 RX ADMIN — AMLODIPINE BESYLATE 10 MG: 10 TABLET ORAL at 08:38

## 2022-05-12 RX ADMIN — POTASSIUM BICARBONATE 40 MEQ: 782 TABLET, EFFERVESCENT ORAL at 01:18

## 2022-05-12 RX ADMIN — ATORVASTATIN CALCIUM 40 MG: 40 TABLET, FILM COATED ORAL at 20:08

## 2022-05-12 RX ADMIN — POTASSIUM BICARBONATE 40 MEQ: 782 TABLET, EFFERVESCENT ORAL at 06:30

## 2022-05-12 RX ADMIN — ENOXAPARIN SODIUM 40 MG: 100 INJECTION SUBCUTANEOUS at 13:24

## 2022-05-12 RX ADMIN — LISINOPRIL 20 MG: 20 TABLET ORAL at 08:37

## 2022-05-12 RX ADMIN — POTASSIUM CHLORIDE 20 MEQ: 20 TABLET, EXTENDED RELEASE ORAL at 08:37

## 2022-05-12 RX ADMIN — ASPIRIN 81 MG 81 MG: 81 TABLET ORAL at 08:37

## 2022-05-12 ASSESSMENT — PAIN SCALES - GENERAL: PAINLEVEL_OUTOF10: 0

## 2022-05-12 NOTE — PROGRESS NOTES
Erin Luna messaged regarding order needing placed for zio patch, as previous order does not specify if zio AT or XT and order needs to be placed by neurology

## 2022-05-12 NOTE — PROGRESS NOTES
[facility]  Neurology follow-up    Date:  5/12/2022  Patient Name:  Mynor Monroe  YOB: 1960  MRN: [id number]     Assessment  Multiple areas of acute ischemic stroke in the left hemisphere in both MCA and PCA territories. Vessel imaging suggestive of thrombus in the left MCA. Due to presence in PCA also concern for cardioembolic etiology. There is also evidence of a remote right cerebellar stroke. There is some small hemorrhagic conversion in areas of infarct in the left occipital lobe. Plan  · Okay to continue aspirin 81 mg  · Continue to hold Brilinta  · Continue statin with goal LDL less than 70  · Echo with bubble, if negative will need event monitoring  · PT OT speech  · SCDs  · Stroke education    History of Present Illness:  Mynor Monroe is a 59 y.o.  male presenting for evaluation of stroke. He presented with right-sided weakness and speech difficulties. NIH was 9 on arrival.  /83. CTA of the head and neck revealed findings suspicious for thrombus within the left M2 branch of the left MCA. He was outside the window for tPA. Changes were seen on the CT so he is not a great wake-up candidate. There was no LVO and no significant perfusion deficit to justify emergent thrombectomy. He was placed on aspirin and loaded with Brilinta 180 mg followed by 90 mg twice daily. MRI of the brain performed this morning reveals scattered infarcts in the left hemisphere in both the MCA and PCA territories. There is some hemorrhagic conversion of stroke in the occipital lobe. Antiplatelets were held. There is also evidence of a remote infarct in the right cerebellum. This morning he states that he continues to have some weakness in the right hand. He feels this is improving. Denies any loss of vision or double vision but feels that when he is reading the words appear long. Denies any speech or swallowing difficulties. No new neuro symptoms.     Repeat CT head this am showed stable small hemorrhage in the L occipital lobe     No family at bedside    No chest pain or palpitations  No SOB  No vertigo, lightheadedness or loss of consciousness  No falls, tripping or stumbling  No incontinence of bowels or bladder  No itching or bruising appreciated  No numbness, tingling or focal arm/leg weakness  + right hand weakness     ROS otherwise negative       Allergies: Allergies   Allergen Reactions    Codeine Nausea And Vomiting        Physical Examination  Vitals   Vitals:    05/12/22 0800 05/12/22 0900 05/12/22 1000 05/12/22 1100   BP: (!) 147/92 (!) 163/86 139/84 138/83   Pulse: 60 58 64 59   Resp: 14 16 18 17   Temp: 98.3 °F (36.8 °C)      TempSrc: Oral      SpO2: 99% 99% 100% 99%   Weight:       Height:            General: Patient appears in no acute distress. Awake and oriented x 3. HEENT: Normocephalic, atraumatic  Chest: Clear to auscultation bilaterally  Heart: No murmurs appreciated  Extremities/Peripheral vascular: No edema/swelling noted. No cold limbs noted. Neurologic Examination    Mental Status  Alert, and oriented to person, place and time. Speech is fluent with intact comprehension. No evidence of memory impairment. Attention and concentration appeared normal.     Cranial Nerves  II. Visual fields full to confrontation bilaterally. III, IV, VI: Pupils equally round and reactive to light, 3 to 2 mm bilaterally. EOMs: full, no nystagmus. V. Facial sensation intact to light touch bilaterally  VII: Facial movements symmetric and strong  VIII: Hearing intact to voice  IX,X: Palate elevates symmetrically.  No dysarthria  XI: Sternocleidomastoid and trapezius 5/5 bilaterally   XII: Tongue is midline    Motor     Right Left   Right Left   Deltoid 4+ 5  Hip Flexion 5 5   Biceps 4+ 5  Knee Extension 5 5   Triceps 4+ 5  Knee Flexion 5 5   Handgrip 4 5  Ankle Dorsiflexion 5 5       Ankle Plantarflexion 5 5     Tone: Normal in all four limbs    Bulk: Normal in all four limbs with no evidence of atrophy    Pronator drift: absent bilaterally    Sensation  · Light Touch: Intact distally in all four limbs    Reflexes    No babinski     Coordination  Rapid alternating movements slower on R  Finger to nose testing slower on R  Heel to tyson testing normal bilaterally    Gait  Deferred for safety/fall consideration    Labs  Recent Labs     05/09/22  2215 05/09/22  2215 05/09/22  2216 05/10/22  0535 05/11/22  0431 05/12/22  0421      < >  --  145   < > 140   K 3.4*   < >  --  3.6   < > 3.2*   CL 99   < >  --  105   < > 102   CO2 23   < >  --  31*   < > 26   BUN 14   < >  --  11   < > 12   CREATININE 1.4*   < >  --  1.1   < > 1.0   GLUCOSE 168*   < >  --  130*   < > 111*   CALCIUM 9.5   < >  --  9.4   < > 9.1   PROT 7.9  --   --   --   --   --    LABALBU 4.2  --   --   --   --   --    BILITOT 0.4  --   --   --   --   --    ALKPHOS 94  --   --   --   --   --    AST 14  --   --   --   --   --    ALT 8  --   --   --   --   --    WBC 9.1   < >  --  6.9   < > 6.8   RBC 5.05   < >  --  4.80   < > 4.64   HGB 14.9   < >  --  14.1   < > 13.6   HCT 42.0   < >  --  41.3   < > 39.0   MCV 83.2   < >  --  86.0   < > 84.1   MCH 29.5   < >  --  29.4   < > 29.3   MCHC 35.5*   < >  --  34.1   < > 34.9*   RDW 12.7   < >  --  12.7   < > 12.7      < >  --  356   < > 365   MPV 9.1   < >  --  9.1   < > 9.0   LACTA  --   --  1.8  --   --   --    HDL  --   --   --  60  --   --    LDLCALC  --   --   --  105*  --   --    LABA1C  --   --   --  5.8*  --   --     < > = values in this interval not displayed. Imaging  CT HEAD WO CONTRAST   Final Result   Stable minimal hemorrhage identified within the area of evolving infarction   in the left distal lobe. No new findings in the interval.         MRI BRAIN WO CONTRAST   Final Result   1. Multiple foci of acute or early subacute infarctions in the left cerebral   hemisphere, most notably in the left occipital region.    2. Small hemorrhage is seen within the infarction in the left occipital lobe. 3. Chronic infarction in the left basal ganglia and right cerebellar   hemisphere. RECOMMENDATIONS:   Unavailable         CT BRAIN PERFUSION   Final Result   1. No significant perfusion mismatch. CTA HEAD W CONTRAST   Final Result   Findings suspicious for thrombus within the left M branch of the MCA, as well   as a in insular branch of the left MCA. Age-indeterminate parenchymal hypodensities within the left cerebral   hemisphere as above, which raise suspicion for acute, nonhemorrhagic   infarction. These findings were discussed with Dr. Shashank Ramirez at 11:31 p.m. on 05/09/2022. CTA NECK W CONTRAST   Final Result   Findings suspicious for thrombus within the left M branch of the MCA, as well   as a in insular branch of the left MCA. Age-indeterminate parenchymal hypodensities within the left cerebral   hemisphere as above, which raise suspicion for acute, nonhemorrhagic   infarction. These findings were discussed with Dr. Shashank Ramirez at 11:31 p.m. on 05/09/2022. XR CHEST PORTABLE   Final Result   No acute cardiopulmonary process.                I have personally reviewed the following images: MRI brain     Electronically signed by MORRIS Ferguson on 5/12/2022 at 11:29 AM

## 2022-05-12 NOTE — PLAN OF CARE
Problem: Discharge Planning  Goal: Discharge to home or other facility with appropriate resources  Outcome: Progressing     Problem: ABCDS Injury Assessment  Goal: Absence of physical injury  Outcome: Progressing  Flowsheets (Taken 5/12/2022 0946 by Veronica Padilla, RN)  Absence of Physical Injury: Implement safety measures based on patient assessment     Problem: Safety - Adult  Goal: Free from fall injury  Outcome: Progressing  Flowsheets (Taken 5/12/2022 0946 by Veronica Padilla, RN)  Free From Fall Injury: Instruct family/caregiver on patient safety

## 2022-05-12 NOTE — PROGRESS NOTES
Hospitalist Progress Note      Synopsis: Patient admitted on 5/9/2022 59y.o. year old male  who  has a past medical history of Hypertension and Thyroid disease.      Patient presented to the emergency department with strokelike symptoms. Patient was mowing the lawn when he became tired. He took a nap sleeping from 2:00 until 11:00 at night. When he woke up his wife noted that he was confused, having trouble speaking and weakness of the right lower and right upper extremity. He also had right-sided facial droop. Vital signs within normal limits and stable. Laboratory studies were unremarkable. NIH of 9. Imaging revealed no LVO. Intracranial stenosis of the left MCA. Cerebral perfusion imaging demonstrates a very tiny penumbra of 3 cc in the watershed territory and mild hypoperfusion in the watershed territory. Patient was outside the window for tPA. Telestroke was consulted. Advised aspirin, Brilinta. MRI of the brain in the morning. Past swallow diet advanced. MRI with multiple left-sided infarcts. Concern for hemorrhagic conversion. Subjective    Clinically improving. Feeling better. Stable overnight. No other overnight issues reported. No CP, SOB, palpitations, blurred vision, HA, lightheadedness, LOC or focal neurological deficits    Exam:  /84   Pulse 64   Temp 98.3 °F (36.8 °C) (Oral)   Resp 18   Ht 5' 6\" (1.676 m)   Wt 147 lb 12.8 oz (67 kg)   SpO2 100%   BMI 23.86 kg/m²   General appearance: No apparent distress, appears stated age and cooperative. HEENT: Pupils equal, round, and reactive to light. Conjunctivae/corneas clear. Neck: Supple. No jugular venous distention. Trachea midline. Respiratory:  Normal respiratory effort. Clear to auscultation, bilaterally without Rales/Wheezes/Rhonchi. Cardiovascular: Regular rate and rhythm with normal S1/S2 without murmurs, rubs or gallops.   Abdomen: Soft, non-tender, non-distended with normal bowel sounds. Musculoskeletal: No clubbing, cyanosis or edema bilaterally. Brisk capillary refill. 2+ lower extremity pulses (dorsalis pedis). Skin:  No rashes    Neurologic: awake, alert and following commands     Medications:  Reviewed    Infusion Medications   Scheduled Medications    potassium chloride  20 mEq Oral Daily with breakfast    lisinopril  20 mg Oral Daily    enoxaparin  40 mg SubCUTAneous Daily    amLODIPine  10 mg Oral Daily    levothyroxine  88 mcg Oral Daily    aspirin  81 mg Oral Daily    atorvastatin  40 mg Oral Nightly    [Held by provider] ticagrelor  90 mg Oral BID     PRN Meds: potassium chloride **OR** potassium alternative oral replacement **OR** potassium chloride, perflutren lipid microspheres, ondansetron **OR** ondansetron, polyethylene glycol    I/O    Intake/Output Summary (Last 24 hours) at 5/12/2022 1047  Last data filed at 5/12/2022 0900  Gross per 24 hour   Intake 490 ml   Output 1400 ml   Net -910 ml       Labs:   Recent Labs     05/10/22  0535 05/11/22 0431 05/12/22 0421   WBC 6.9 7.4 6.8   HGB 14.1 13.5 13.6   HCT 41.3 39.9 39.0    361 365       Recent Labs     05/10/22  0535 05/10/22  0535 05/11/22 0431 05/11/22 2203 05/12/22 0421     --  141  --  140   K 3.6   < > 2.9* 3.1* 3.2*     --  102  --  102   CO2 31*  --  26  --  26   BUN 11  --  11  --  12   CREATININE 1.1  --  0.9  --  1.0   CALCIUM 9.4  --  9.2  --  9.1    < > = values in this interval not displayed. Recent Labs     05/09/22 2215   PROT 7.9   ALKPHOS 94   ALT 8   AST 14   BILITOT 0.4       Recent Labs     05/09/22 2216   INR 1.1       No results for input(s): Nasreen Luna in the last 72 hours.     Chronic labs:  Lab Results   Component Value Date    CHOL 172 05/10/2022    TRIG 34 05/10/2022    HDL 60 05/10/2022    LDLCALC 105 (H) 05/10/2022    TSH 2.870 05/10/2022    PSA 4.16 (H) 01/04/2022    INR 1.1 05/09/2022    LABA1C 5.8 (H) 05/10/2022       Radiology:  Imaging studies reviewed today. MRI brain  1. Multiple foci of acute or early subacute infarctions in the left cerebral   hemisphere, most notably in the left occipital region. 2. Small hemorrhage is seen within the infarction in the left occipital lobe. 3. Chronic infarction in the left basal ganglia and right cerebellar   hemisphere. CT head 5/12  Impression   Stable minimal hemorrhage identified within the area of evolving infarction   in the left distal lobe.  No new findings in the interval.           ASSESSMENT:    Principal Problem:    Acute ischemic stroke ([address]  Active Problems:    HTN (hypertension)    Hypothyroidism    Hypokalemia  Resolved Problems:    * No resolved hospital problems. *       PLAN:    Admitted to neuro ICU for acute stroke  Cont Asa-- Brilinta-held due to concern for hemorrhagic conversion  Statin  MRI brain demonstrating multiple acute left-sided infarcts as well as concern for small left occipital hemorrhage  Follow-up CT head 5/12 demonstrating minimal hemorrhagic conversion  CTA head neck-concern for thrombus in the left MCA, no significant carotid stenosis  Embolic work-up  Echo  Hypokalemia- replace monitor, Mg OK  Hypothyroid  Cont levothyroxine, TSH reviewed   Medications for other co morbidities cont as appropriate w dosage adjustments as necessary   PT/OT  D/C planning      Diet: ADULT DIET; Regular; No Added Salt (3-4 gm)  Code Status: Full Code  PT/OT Eval Status:   Ordered  DVT Prophylaxis:   SCDs  Recommended disposition at discharge:   TBD likely home    +++++++++++++++++++++++++++++++++++++++++++++++++  Sena Bal MD   [facility].  +++++++++++++++++++++++++++++++++++++++++++++++++  NOTE: This report was transcribed using voice recognition software. Every effort was made to ensure accuracy; however, inadvertent computerized transcription errors may be present.

## 2022-05-12 NOTE — DISCHARGE SUMMARY
Physician Discharge Summary     Patient ID:  Larry Olmedo  [id number]  89 y.o.  1960    Admit date: 5/9/2022    Discharge date and time:  5/13/2022    Discharge Diagnoses: Principal Problem:    Acute ischemic stroke [facility])  Active Problems:    HTN (hypertension)    Hypothyroidism    Hypokalemia  Resolved Problems:    * No resolved hospital problems. *      Consults: IP CONSULT TO NEUROLOGY  IP CONSULT TO HOSPITALIST  IP CONSULT TO NEUROLOGY    Procedures: See below    Hospital Course: 59y.o. year old male  who  has a past medical history of Hypertension and Thyroid disease.      Patient presented to the emergency department with strokelike symptoms.  Patient was mowing the lawn when he became tired. [facility] took a nap sleeping from 2:00 until 11:00 at night.  When he woke up his wife noted that he was confused, having trouble speaking and weakness of the right lower and right upper extremity.  He also had right-sided facial droop.  Vital signs within normal limits and stable.  Laboratory studies were unremarkable. [address] of 9.  Imaging revealed no LVO.  Intracranial stenosis of the left MCA.  Cerebral perfusion imaging demonstrates a very tiny penumbra of 3 cc in the watershed territory and mild hypoperfusion in the watershed territory.  Patient was outside the window for tPA. [facility] was consulted.  Advised aspirin, Brilinta.  MRI of the brain in the morning. Past swallow diet advanced. MRI with multiple left-sided infarcts. Concern for hemorrhagic conversion.     Admitted to neuro ICU for acute stroke  Cont Asa-- Brilinta-held due to concern for hemorrhagic conversion  Statin  MRI brain demonstrating multiple acute left-sided infarcts as well as concern for small left occipital hemorrhage  Follow-up CT head 5/12 demonstrating minimal hemorrhagic conversion  CTA head neck-concern for thrombus in the left MCA, no significant carotid stenosis  Embolic work-up  Echo completed  Zio patch with discharge  Neurology consult appreciated-discussed case plan for close follow-up  Hypokalemia- replaced monitor, Mg reviewed  Hypothyroid  Cont levothyroxine, TSH reviewed   Discharge Exam:  See progress note from today    Condition:  Stable    Disposition: home    Patient Instructions:   Current Discharge Medication List      START taking these medications    Details   aspirin 81 MG chewable tablet Take 1 tablet by mouth daily  Qty: 30 tablet, Refills: 3      atorvastatin (LIPITOR) 40 MG tablet Take 1 tablet by mouth nightly  Qty: 30 tablet, Refills: 3         CONTINUE these medications which have NOT CHANGED    Details   potassium chloride (MICRO-K) 10 MEQ extended release capsule Take by mouth 2 times daily Dosage unkown      oxyCODONE-acetaminophen (PERCOCET) 5-325 MG per tablet Take 1 tablet by mouth every 4 hours as needed for Pain.      levothyroxine (SYNTHROID) 112 MCG tablet Take 88 mcg by mouth Daily      amLODIPine (NORVASC) 5 MG tablet Take 10 mg by mouth daily       lisinopril (PRINIVIL;ZESTRIL) 10 MG tablet Take 10 mg by mouth daily.            STOP taking these medications       naproxen sodium (ANAPROX DS) 550 MG tablet Comments:   Reason for Stopping:         naproxen (NAPROSYN) 500 MG tablet Comments:   Reason for Stopping:             Activity: activity as tolerated  Diet: regular diet    Follow-up with 1 week PCP 2 weeks neurology    Note that over 30 minutes was spent in preparing discharge papers, discussing discharge with patient, staff, consultants, medication review, arranging follow up, etc.    Signed:  Jagdish Foster MD  5/13/2022  3:37 PM

## 2022-05-13 VITALS
OXYGEN SATURATION: 96 % | SYSTOLIC BLOOD PRESSURE: 139 MMHG | BODY MASS INDEX: 23.75 KG/M2 | WEIGHT: 147.8 LBS | HEIGHT: 66 IN | DIASTOLIC BLOOD PRESSURE: 86 MMHG | TEMPERATURE: 97.8 F | RESPIRATION RATE: 18 BRPM | HEART RATE: 51 BPM

## 2022-05-13 LAB
ANION GAP SERPL CALCULATED.3IONS-SCNC: 10 MMOL/L (ref 7–16)
BUN BLDV-MCNC: 11 MG/DL (ref 6–23)
CALCIUM SERPL-MCNC: 9.1 MG/DL (ref 8.6–10.2)
CHLORIDE BLD-SCNC: 101 MMOL/L (ref 98–107)
CO2: 29 MMOL/L (ref 22–29)
CREAT SERPL-MCNC: 1 MG/DL (ref 0.7–1.2)
GFR AFRICAN AMERICAN: >60
GFR NON-AFRICAN AMERICAN: >60 ML/MIN/1.73
GLUCOSE BLD-MCNC: 101 MG/DL (ref 74–99)
HCT VFR BLD CALC: 38.6 % (ref 37–54)
HEMOGLOBIN: 13.2 G/DL (ref 12.5–16.5)
MAGNESIUM: 2 MG/DL (ref 1.6–2.6)
MCH RBC QN AUTO: 29.3 PG (ref 26–35)
MCHC RBC AUTO-ENTMCNC: 34.2 % (ref 32–34.5)
MCV RBC AUTO: 85.8 FL (ref 80–99.9)
PDW BLD-RTO: 12.6 FL (ref 11.5–15)
PLATELET # BLD: 346 E9/L (ref 130–450)
PMV BLD AUTO: 9.1 FL (ref 7–12)
POTASSIUM REFLEX MAGNESIUM: 3.4 MMOL/L (ref 3.5–5)
RBC # BLD: 4.5 E12/L (ref 3.8–5.8)
SODIUM BLD-SCNC: 140 MMOL/L (ref 132–146)
WBC # BLD: 6.2 E9/L (ref 4.5–11.5)

## 2022-05-13 PROCEDURE — 36415 COLL VENOUS BLD VENIPUNCTURE: CPT

## 2022-05-13 PROCEDURE — 85027 COMPLETE CBC AUTOMATED: CPT

## 2022-05-13 PROCEDURE — 6370000000 HC RX 637 (ALT 250 FOR IP): Performed by: INTERNAL MEDICINE

## 2022-05-13 PROCEDURE — 6370000000 HC RX 637 (ALT 250 FOR IP): Performed by: FAMILY MEDICINE

## 2022-05-13 PROCEDURE — 93246 EXT ECG>7D<15D RECORDING: CPT

## 2022-05-13 PROCEDURE — 83735 ASSAY OF MAGNESIUM: CPT

## 2022-05-13 PROCEDURE — 6360000002 HC RX W HCPCS: Performed by: FAMILY MEDICINE

## 2022-05-13 PROCEDURE — 99232 SBSQ HOSP IP/OBS MODERATE 35: CPT | Performed by: PHYSICIAN ASSISTANT

## 2022-05-13 PROCEDURE — 80048 BASIC METABOLIC PNL TOTAL CA: CPT

## 2022-05-13 RX ADMIN — LISINOPRIL 20 MG: 20 TABLET ORAL at 08:42

## 2022-05-13 RX ADMIN — ASPIRIN 81 MG 81 MG: 81 TABLET ORAL at 08:42

## 2022-05-13 RX ADMIN — LEVOTHYROXINE SODIUM 88 MCG: 0.09 TABLET ORAL at 08:07

## 2022-05-13 RX ADMIN — POTASSIUM CHLORIDE 20 MEQ: 20 TABLET, EXTENDED RELEASE ORAL at 08:42

## 2022-05-13 RX ADMIN — POTASSIUM CHLORIDE 40 MEQ: 20 TABLET, EXTENDED RELEASE ORAL at 11:11

## 2022-05-13 RX ADMIN — ENOXAPARIN SODIUM 40 MG: 100 INJECTION SUBCUTANEOUS at 08:41

## 2022-05-13 RX ADMIN — AMLODIPINE BESYLATE 10 MG: 10 TABLET ORAL at 08:42

## 2022-05-13 ASSESSMENT — PAIN SCALES - GENERAL: PAINLEVEL_OUTOF10: 0

## 2022-05-13 NOTE — PROGRESS NOTES
Hospitalist Progress Note      Synopsis: Patient admitted on 5/9/2022 59y.o. year old male  who  has a past medical history of Hypertension and Thyroid disease.      Patient presented to the emergency department with strokelike symptoms. Patient was mowing the lawn when he became tired. He took a nap sleeping from 2:00 until 11:00 at night. When he woke up his wife noted that he was confused, having trouble speaking and weakness of the right lower and right upper extremity. He also had right-sided facial droop. Vital signs within normal limits and stable. Laboratory studies were unremarkable. NIH of 9. Imaging revealed no LVO. Intracranial stenosis of the left MCA. Cerebral perfusion imaging demonstrates a very tiny penumbra of 3 cc in the watershed territory and mild hypoperfusion in the watershed territory. Patient was outside the window for tPA. Telestroke was consulted. Advised aspirin, Brilinta. MRI of the brain in the morning. Past swallow diet advanced. MRI with multiple left-sided infarcts. Concern for hemorrhagic conversion. Subjective    Clinically improving. Feeling better. Stable overnight. No other overnight issues reported. No CP, SOB, palpitations, blurred vision, HA, lightheadedness, LOC or focal neurological deficits    Exam:  /85   Pulse 69   Temp 97.1 °F (36.2 °C) (Temporal)   Resp 27   Ht 5' 6\" (1.676 m)   Wt 147 lb 12.8 oz (67 kg)   SpO2 98%   BMI 23.86 kg/m²   General appearance: No apparent distress, appears stated age and cooperative. HEENT: Pupils equal, round, and reactive to light. Conjunctivae/corneas clear. Neck: Supple. No jugular venous distention. Trachea midline. Respiratory:  Normal respiratory effort. Clear to auscultation, bilaterally without Rales/Wheezes/Rhonchi. Cardiovascular: Regular rate and rhythm with normal S1/S2 without murmurs, rubs or gallops.   Abdomen: Soft, non-tender, non-distended with normal bowel sounds. Musculoskeletal: No clubbing, cyanosis or edema bilaterally. Brisk capillary refill. 2+ lower extremity pulses (dorsalis pedis). Skin:  No rashes    Neurologic: awake, alert and following commands     Medications:  Reviewed    Infusion Medications   Scheduled Medications    potassium chloride  20 mEq Oral Daily with breakfast    lisinopril  20 mg Oral Daily    enoxaparin  40 mg SubCUTAneous Daily    amLODIPine  10 mg Oral Daily    levothyroxine  88 mcg Oral Daily    aspirin  81 mg Oral Daily    atorvastatin  40 mg Oral Nightly    [Held by provider] ticagrelor  90 mg Oral BID     PRN Meds: potassium chloride **OR** potassium alternative oral replacement **OR** potassium chloride, perflutren lipid microspheres, ondansetron **OR** ondansetron, polyethylene glycol    I/O    Intake/Output Summary (Last 24 hours) at 5/13/2022 0756  Last data filed at 5/13/2022 0445  Gross per 24 hour   Intake 960 ml   Output --   Net 960 ml       Labs:   Recent Labs     05/11/22 0431 05/12/22 0421 05/13/22 0453   WBC 7.4 6.8 6.2   HGB 13.5 13.6 13.2   HCT 39.9 39.0 38.6    365 346       Recent Labs     05/11/22  0431 05/11/22  2203 05/12/22 0421 05/13/22 0453     --  140 140   K 2.9* 3.1* 3.2* 3.4*     --  102 101   CO2 26  --  26 29   BUN 11  --  12 11   CREATININE 0.9  --  1.0 1.0   CALCIUM 9.2  --  9.1 9.1       No results for input(s): PROT, ALB, ALKPHOS, ALT, AST, BILITOT, AMYLASE, LIPASE in the last 72 hours. No results for input(s): INR in the last 72 hours. No results for input(s): Candida Height in the last 72 hours. Chronic labs:  Lab Results   Component Value Date    CHOL 172 05/10/2022    TRIG 34 05/10/2022    HDL 60 05/10/2022    LDLCALC 105 (H) 05/10/2022    TSH 2.870 05/10/2022    PSA 4.16 (H) 01/04/2022    INR 1.1 05/09/2022    LABA1C 5.8 (H) 05/10/2022       Radiology:  Imaging studies reviewed today. MRI brain  1.  Multiple foci of acute or early subacute infarctions in the left cerebral   hemisphere, most notably in the left occipital region. 2. Small hemorrhage is seen within the infarction in the left occipital lobe. 3. Chronic infarction in the left basal ganglia and right cerebellar   hemisphere. CT head 5/12  Impression   Stable minimal hemorrhage identified within the area of evolving infarction   in the left distal lobe.  No new findings in the interval.           ASSESSMENT:    Principal Problem:    Acute ischemic stroke ([address]  Active Problems:    HTN (hypertension)    Hypothyroidism    Hypokalemia  Resolved Problems:    * No resolved hospital problems. *       PLAN:    Admitted to neuro ICU for acute stroke  Cont Asa-- Brilinta-held due to concern for hemorrhagic conversion  Statin  MRI brain demonstrating multiple acute left-sided infarcts as well as concern for small left occipital hemorrhage  Follow-up CT head 5/12 demonstrating minimal hemorrhagic conversion  CTA head neck-concern for thrombus in the left MCA, no significant carotid stenosis  Embolic work-up  Echo  Hypokalemia- replace monitor, Mg OK  Hypothyroid  Cont levothyroxine, TSH reviewed   Medications for other co morbidities cont as appropriate w dosage adjustments as necessary   PT/OT  D/C planning      Diet: ADULT DIET; Regular; No Added Salt (3-4 gm)  Code Status: Full Code  PT/OT Eval Status:   Ordered  DVT Prophylaxis:   SCDs  Recommended disposition at discharge:   Home today with Zio patch    +++++++++++++++++++++++++++++++++++++++++++++++++  Shauna Houston MD   [facility].  +++++++++++++++++++++++++++++++++++++++++++++++++  NOTE: This report was transcribed using voice recognition software. Every effort was made to ensure accuracy; however, inadvertent computerized transcription errors may be present.

## 2022-05-13 NOTE — PROGRESS NOTES
ZIO Patch - Cardiac Event Monitor Instructions      You are being prescribed a Zio by i[employer] heart monitor to wear over the next 14 days or as specified by your provider. Your Zio came with a prepaid [employer] postage to return the monitor at the end of the wear. Make sure to keep the box and/or prepaid envelope that came with your Zio monitor and mail the device via [employer]PS promptly at the end of wear. FOR ASSISTANCE WITH YOU Pawel Palacio CALL CUSTOMER CARE  at [phone]    Patient Instructions:  - Avoid showering for the first 24 hours  - For Zio AT (MCT) Monitor - Keep the black gateway nearby   - Press the button on the center of your P[employer]i Loan when you feel symptoms   - Log the symptom in your log book OR download the free [facility] yasmeen to log your symptoms on your phone  - Wear the Patsi Loan for 14 days or as specified you your provider  - Removal instructions are included with your monitor    [employer]ou[employer] [place] [address]  at [phone]  - Skin reaction  - Device removed accidentally   - Flashing orange light       Insurance Information:  Your insurance company may send an Explanation of Benefits (EOB). An EOB from your insurance carrier is NOT a bill. You will be responsible for usual out-of-pocket cost associated with deductibles and co-insurances determined by your instance provider. If there is a balance due, you will receive a statement from [employer] monitor a cash pay option is available and financial assistance programs are available for those who qualify. For insurance coverage, financial assistance, or out-of-pocket estimates, call Zio at [phone]    Prompt Return of the Zio Monitor:  The device and kit components must be returned immediately upon completion of wear using the pre-paid . Delays in return may result in delayed final test results.     FOR ASSISTANCE WITH YOUR Pawel Palacio CALL CUSTOMER CARE  at [phone]

## 2022-05-13 NOTE — CARE COORDINATION
Order received from Neurology for [address]. Nursing notified. OK to discharge to home today. Patient walked 400 ft x3 no AD SBA. No needs identified at this time. Patient's wife to provide transportation. Will continue to follow.      Evelina Russell, KRISTEN.  John Gracia  [phone]

## 2022-05-13 NOTE — PROGRESS NOTES
Advised pt to call out prior to exiting bed and would place bed alarm on as a safety precaution. Pt asked that bed alarm not be placed. Pt said he would exercise caution and change position slowly when exiting the bed. Nurse agreed to pt wish to not alarm bed alarm.

## 2022-05-13 NOTE — PROGRESS NOTES
Applied Zio monitor for 14  days. # [id number]. Instructed patient to avoid showering in the first 24 hours. Press the button on the monitor when you feel a symptom and write it in your diary. Do not submerge in water. No lotion or power near the patch. Please return the monitor in the box provided. Patient verbalized understanding. Unit clerk notified to register the device.

## 2022-05-13 NOTE — PROGRESS NOTES
CLINICAL PHARMACY NOTE: MEDS TO BEDS    Total # of Prescriptions Filled: 2   The following medications were delivered to the patient:  · Atorvastatin 40mg  · Aspirin 81mg    Additional Documentation:    Picked up

## 2022-05-13 NOTE — PROGRESS NOTES
[facility]  Neurology follow-up    Date:  5/13/2022  Patient Name:  Denilson Hall  YOB: 1960  MRN: [id number]     Assessment  Multiple areas of acute ischemic stroke in the left hemisphere in both MCA and PCA territories. Vessel imaging suggestive of thrombus in the left MCA. Due to presence in PCA also concern for cardioembolic etiology. There is also evidence of a remote right cerebellar stroke. There is some small hemorrhagic conversion in areas of infarct in the left occipital lobe that is stable on repeat CT head     Plan  · Zio patch AT 14 days, if negative will need ILR. Okay for d/c from neuro  · Okay to continue aspirin 81 mg  · Continue to hold Brilinta  · Recommend repeating CT head in 1-2 weeks and will consider resuming DAPT at stroke clinic visit   · Continue statin with goal LDL less than 70  · PT OT speech  · SCDs  · Stroke education  · Family updated extensively at this time     History of Present Illness:  Denilson Hall is a 59 y.o.  male presenting for evaluation of stroke. He presented with right-sided weakness and speech difficulties. NIH was 9 on arrival.  /83. CTA of the head and neck revealed findings suspicious for thrombus within the left M2 branch of the left MCA. He was outside the window for tPA. Changes were seen on the CT so he is not a great wake-up candidate. There was no LVO and no significant perfusion deficit to justify emergent thrombectomy. He was placed on aspirin and loaded with Brilinta 180 mg followed by 90 mg twice daily. MRI of the brain performed this morning reveals scattered infarcts in the left hemisphere in both the MCA and PCA territories. There is some hemorrhagic conversion of stroke in the occipital lobe. Antiplatelets were held. There is also evidence of a remote infarct in the right cerebellum. This morning he states that he continues to have some weakness in the right hand.   He feels this is improving. Denies any loss of vision or double vision but feels that when he is reading the words appear long. Denies any speech or swallowing difficulties. No new neuro symptoms. Repeat CT head  showed stable small hemorrhage in the L occipital lobe     Echo unrevealing. Family at bedside- reviewed images and all questions answered. No chest pain or palpitations  No SOB  No vertigo, lightheadedness or loss of consciousness  No falls, tripping or stumbling  No incontinence of bowels or bladder  No itching or bruising appreciated  No numbness, tingling or focal arm/leg weakness  + right hand weakness   + vision impairment     ROS otherwise negative       Allergies: Allergies   Allergen Reactions    Codeine Nausea And Vomiting        Physical Examination  Vitals   Vitals:    05/12/22 2000 05/13/22 0000 05/13/22 0443 05/13/22 0755   BP: (!) 166/88 (!) 158/89 124/85 139/86   Pulse: 60 58 69 51   Resp: 18 18 27 18   Temp: 98.3 °F (36.8 °C) 97.4 °F (36.3 °C) 97.1 °F (36.2 °C) 97.8 °F (36.6 °C)   TempSrc: Oral Oral Temporal Temporal   SpO2: 98% 100% 98% 96%   Weight:       Height:            General: Patient appears in no acute distress. Awake and oriented x 3. HEENT: Normocephalic, atraumatic  Chest: Clear to auscultation bilaterally  Heart: No murmurs appreciated  Extremities/Peripheral vascular: No edema/swelling noted. No cold limbs noted. Neurologic Examination    Mental Status  Alert, and oriented to person, place and time. Speech is fluent with intact comprehension. No evidence of memory impairment. Attention and concentration appeared normal. Thought process slowed     Cranial Nerves  II. Visual R lateral superior quadrantopsia   III, IV, VI: Pupils equally round and reactive to light, 3 to 2 mm bilaterally. EOMs: full, no nystagmus.    V. Facial sensation intact to light touch bilaterally  VII: Facial movements symmetric and strong  VIII: Hearing intact to voice  IX,X: Palate elevates symmetrically. No dysarthria  XI: Sternocleidomastoid and trapezius 5/5 bilaterally   XII: Tongue is midline    Motor     Right Left   Right Left   Deltoid 4+ 5  Hip Flexion 5 5   Biceps 4+ 5  Knee Extension 5 5   Triceps 4+ 5  Knee Flexion 5 5   Handgrip 4 5  Ankle Dorsiflexion 5 5       Ankle Plantarflexion 5 5     Tone: Normal in all four limbs    Bulk: Normal in all four limbs with no evidence of atrophy    Pronator drift: absent bilaterally    Sensation  · Light Touch: Intact distally in all four limbs    Reflexes    No babinski     Coordination  Rapid alternating movements slower on R  Finger to nose testing slower on R  Heel to tyson testing normal bilaterally    Gait  Deferred for safety/fall consideration    Labs  Recent Labs     05/13/22  0453      K 3.4*      CO2 29   BUN 11   CREATININE 1.0   GLUCOSE 101*   CALCIUM 9.1   WBC 6.2   RBC 4.50   HGB 13.2   HCT 38.6   MCV 85.8   MCH 29.3   MCHC 34.2   RDW 12.6      MPV 9.1     Imaging  CT HEAD WO CONTRAST   Final Result   Stable minimal hemorrhage identified within the area of evolving infarction   in the left distal lobe. No new findings in the interval.         MRI BRAIN WO CONTRAST   Final Result   1. Multiple foci of acute or early subacute infarctions in the left cerebral   hemisphere, most notably in the left occipital region. 2. Small hemorrhage is seen within the infarction in the left occipital lobe. 3. Chronic infarction in the left basal ganglia and right cerebellar   hemisphere. RECOMMENDATIONS:   Unavailable         CT BRAIN PERFUSION   Final Result   1. No significant perfusion mismatch. CTA HEAD W CONTRAST   Final Result   Findings suspicious for thrombus within the left M branch of the MCA, as well   as a in insular branch of the left MCA. Age-indeterminate parenchymal hypodensities within the left cerebral   hemisphere as above, which raise suspicion for acute, nonhemorrhagic   infarction.       These findings were discussed with Dr. Gurinder Sykes at 11:31 p.m. on 05/09/2022. CTA NECK W CONTRAST   Final Result   Findings suspicious for thrombus within the left M branch of the MCA, as well   as a in insular branch of the left MCA. Age-indeterminate parenchymal hypodensities within the left cerebral   hemisphere as above, which raise suspicion for acute, nonhemorrhagic   infarction. These findings were discussed with Dr. Gurinder Sykes at 11:31 p.m. on 05/09/2022. XR CHEST PORTABLE   Final Result   No acute cardiopulmonary process.                I have personally reviewed the following images: MRI brain     Electronically signed by MORRIS Love on 5/13/2022 at 10:31 AM

## 2022-05-15 ASSESSMENT — ENCOUNTER SYMPTOMS
ABDOMINAL PAIN: 0
NAUSEA: 0
BACK PAIN: 0
SINUS PRESSURE: 0
DIARRHEA: 0
EYE REDNESS: 0
VOMITING: 0
WHEEZING: 0
SORE THROAT: 0
EYE PAIN: 0
EYE DISCHARGE: 0
COUGH: 0
SHORTNESS OF BREATH: 0

## 2022-06-03 DIAGNOSIS — G45.9 TIA (TRANSIENT ISCHEMIC ATTACK): ICD-10-CM

## 2022-06-05 LAB — URINE CULTURE, ROUTINE: NORMAL

## 2022-06-16 ENCOUNTER — OFFICE VISIT (OUTPATIENT)
Dept: NEUROLOGY | Age: 62
End: 2022-06-16
Payer: COMMERCIAL

## 2022-06-16 VITALS
HEART RATE: 60 BPM | DIASTOLIC BLOOD PRESSURE: 84 MMHG | OXYGEN SATURATION: 98 % | WEIGHT: 148 LBS | TEMPERATURE: 98 F | BODY MASS INDEX: 23.23 KG/M2 | HEIGHT: 67 IN | SYSTOLIC BLOOD PRESSURE: 147 MMHG

## 2022-06-16 DIAGNOSIS — I63.412 CEREBROVASCULAR ACCIDENT (CVA) DUE TO EMBOLISM OF LEFT MIDDLE CEREBRAL ARTERY (HCC): Primary | ICD-10-CM

## 2022-06-16 DIAGNOSIS — I10 PRIMARY HYPERTENSION: ICD-10-CM

## 2022-06-16 DIAGNOSIS — E78.5 DYSLIPIDEMIA, GOAL LDL BELOW 70: ICD-10-CM

## 2022-06-16 PROCEDURE — 99214 OFFICE O/P EST MOD 30 MIN: CPT | Performed by: NURSE PRACTITIONER

## 2022-06-16 RX ORDER — TAMSULOSIN HYDROCHLORIDE 0.4 MG/1
CAPSULE ORAL
COMMUNITY

## 2022-06-16 NOTE — PROGRESS NOTES
STROKE CLINIC VISIT   6/16/2022    History of Present Illness:    Gris Del Castillo is a 59 y.o. right handed male who presents following recent hospitalization for acute ischemic stroke. He presented to the hospital on 5/9/2022 for sudden onset of right-sided weakness and speech difficulties. He reports waking from a nap that day when he noted his speech was off. His wife was home and reports he kept repeating the same phrase and appeared confusion. He notes that earlier that day he had difficulty reading but thought it would pass. Of note, he recently underwent TURP on 5/2/2022. Upon arrival to the emergency department, imaging revealed subacute stroke with findings suspicious for thrombus within the left M2 branch. He was deemed not a good candidate for treatment due to early signs of stroke on imaging and admitted for further evaluation. MRI of the brain revealed left hemispheric strokes involving the left MCA and PCA territories with hemorrhagic transformation of left occipital lobe infarct. Imaging also demonstrates a remote right cerebellar stroke. Repeat CT imaging revealed stable hemorrhage. He was started dual antiplatelet therapy initially and then switched to aspirin only in setting of ICH. He was to have outpatient CT 1-2 weeks later to determine reinitiating of dual antiplatelet therapy however there is no record this occurred and he presents to clinic 1 month later. He presents today with his wife, both are good historians. He denies any ongoing neurologic complaints or new symptoms. He is not currently receiving any therapies. He has no prior history of stroke or heart disease. He was previously being treated for hypertension and hypothyroidism.     Current Functional Status (Modified Rose Scale):  0=No symptoms at all    Past Medical History:         Diagnosis Date    Hypertension     Thyroid disease         Past Surgical History:         Procedure Laterality Date    KNEE ARTHROSCOPY bilateral knees        Family History:   No family history on file. Review of Systems:     No chest pain, palpitations or shortness of breath  No vertigo, lightheadedness or loss of consciousness  No falls, tripping or stumbling  No incontinence of bowels or bladder  No numbness, tingling or focal arm/leg weakness  No swallowing or speech difficulties  No blurred vision, double vision or vision loss     ROS otherwise negative    Objective:     BP (!) 147/84   Pulse 60   Temp 98 °F (36.7 °C)   Ht 5' 6.5\" (1.689 m)   Wt 148 lb (67.1 kg)   SpO2 98%   BMI 23.53 kg/m²     General Appearance: alert, cooperative, no distress, appears stated age    Head: normocephalic, without obvious abnormality, atraumatic    Eyes: conjunctiva/corneas clear    Lungs: clear to auscultation bilaterally, respirations unlabored    Heart: regular rate and rhythm, S1 and S2 normal   Extremities: normal, atraumatic, no cyanosis or edema   Skin: color, texture and turgor normal, no rashes or lesions     Mental Status: Alert, and oriented to person, place and time. Speech is fluent with intact comprehension. No evidence of memory impairment.  Attention and concentration appeared normal.     Cranial Nerves:  I: smell NA   II: visual acuity  NA   II: visual fields Full    II: pupils Equal and reactive    III,VII: ptosis None   III,IV,VI: extraocular muscles  Full ROM without nystagmus   V: mastication Normal   V: facial light touch sensation  Normal   V,VII: corneal reflex     VII: facial muscle function - upper  Normal   VII: facial muscle function - lower Normal   VIII: hearing Normal   IX: soft palate elevation  Normal   IX,X: gag reflex    XI: trapezius strength  5/5   XI: sternocleidomastoid strength 5/5   XI: neck extension strength  5/5   XII: tongue strength  Normal     Motor:  Grossly 5/5 throughout  No abnormal movements    Gait:  Normal      Laboratory/Radiology:     CBC:   Lab Results   Component Value Date    WBC 6.2 05/13/2022 RBC 4.50 05/13/2022    HGB 13.2 05/13/2022    HCT 38.6 05/13/2022    MCV 85.8 05/13/2022    MCH 29.3 05/13/2022    MCHC 34.2 05/13/2022    RDW 12.6 05/13/2022     05/13/2022    MPV 9.1 05/13/2022     CMP:    Lab Results   Component Value Date     05/13/2022    K 3.4 05/13/2022     05/13/2022    CO2 29 05/13/2022    BUN 11 05/13/2022    CREATININE 1.0 05/13/2022    GFRAA >60 05/13/2022    LABGLOM >60 05/13/2022    GLUCOSE 101 05/13/2022    GLUCOSE 112 02/13/2012    PROT 7.9 05/09/2022    LABALBU 4.2 05/09/2022    LABALBU 4.3 02/13/2012    CALCIUM 9.1 05/13/2022    BILITOT 0.4 05/09/2022    ALKPHOS 94 05/09/2022    AST 14 05/09/2022    ALT 8 05/09/2022     HgBA1c:    Lab Results   Component Value Date    LABA1C 5.8 05/10/2022     FLP:    Lab Results   Component Value Date    TRIG 34 05/10/2022    HDL 60 05/10/2022    LDLCALC 105 05/10/2022    LABVLDL 7 05/10/2022     CTA head/neck with contrast 5/9/2022  Findings suspicious for thrombus within the left M branch of the MCA, as well   as a in insular branch of the left MCA.       Age-indeterminate parenchymal hypodensities within the left cerebral   hemisphere as above, which raise suspicion for acute, nonhemorrhagic   infarction. CT brain perfusion 5/10/2022  1. No significant perfusion mismatch. MRI brain without contrast 5/10/2022  1. Multiple foci of acute or early subacute infarctions in the left cerebral   hemisphere, most notably in the left occipital region. 2. Small hemorrhage is seen within the infarction in the left occipital lobe. 3. Chronic infarction in the left basal ganglia and right cerebellar   hemisphere. Transthoracic echocardiogram  5/12/2022   Summary   Normal left ventricular systolic function. Ejection fraction is visually estimated at > 60%. Normal right ventricular size and function (TAPSE 2.6 cm). No evidence of interatrial shunting on bubble study. Mild aortic regurgitation.     Cardiac event monitor (14-day Zio Patch) 6/3/2022  Predominant underlying rhythm was Sinus Rhythm. 4 Supraventricular  Tachycardia runs occurred, the run with the longest lasting 6  beats. Isolated SVEs were rare (<1.0%), SVE Couplets were rare  (<1.0%), and SVE Triplets were rare (<1.0%). Isolated VEs were rare  (<1.0%, 5261), VE Couplets were rare (<1.0%, 71), and VE Triplets  were rare (<1.0%, 9). Ventricular Bigeminy and Trigeminy were  present. No triggered events or symptoms reported. * Images and labs personally reviewed at the time of this office visit    Assessment/Plan:     Acute ischemic stroke in the left MCA distribution with vessel imaging suggestive of thrombus in the left MCA. Mechanism of stroke concerning for embolic source given lack of intracranial atherosclerotic disease. Ipsilateral carotid artery without evidence of significant stenosis to suggest artery-to-artery embolus. Cardioembolism of high concern. TTE and telemetry monitoring were unrevealing. Outpatient 14-day arrhyhtmia monitoring without evidence of PAF. Discussed at length need for extended cardiac monitoring to further evaluate for PAF as 14-day monitor does not exclude this possibility. Recommend referral to cardiology for COSMO/ILR. He verbalizes understanding that source of his stroke remains undetermined. We reviewed the risk of recurrent stroke in setting of atrial fibrillation and the need to determine appropriate antithrombotic management to reduce the risk of future strokes, disability and death. He and his wife would like to discuss further and were encouraged to call office with their decision. He remains on aspirin for secondary stroke prevention. His identified risk factors include hypertension, dyslipidemia, and history of stroke.      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 Patient advised to take the medications as prescribed, which will be detailed in the discharge instructions, and to not stop taking them without consulting their physician. In addition, pt. advised to maintain a healthy diet, exercise regularly and to not smoke. Signs and symptoms of stroke including B.E.F.A.S. T:   Balance: Does the person have a sudden loss of balance? Eyes: Has the person lost vision in one or both eyes? Face: Does the person's face look uneven? Arm: Is one arm weaker or numb? Speech: Is the person's speech slurred? Does the person have trouble speaking or seen confused? Time: Call 9-1-1 immediately. Follow-up with neurology in 3-4 months  Follow-up with PCP as planned     CHADD Mendoza CNP   10:09 AM  6/16/22    I spent 40 minutes with this patient obtaining the HPI and discussing test results and exam. All questions were answered prior to leaving my office.

## 2022-10-07 ENCOUNTER — HOSPITAL ENCOUNTER (OUTPATIENT)
Dept: ULTRASOUND IMAGING | Age: 62
Discharge: HOME | End: 2022-10-09
Payer: COMMERCIAL

## 2022-10-07 DIAGNOSIS — I10 HYPERTENSION, UNSPECIFIED TYPE: ICD-10-CM

## 2022-10-07 PROCEDURE — 76770 US EXAM ABDO BACK WALL COMP: CPT

## 2022-10-18 ENCOUNTER — HOSPITAL ENCOUNTER (OUTPATIENT)
Age: 62
Discharge: HOME | End: 2022-10-18

## 2022-10-21 ENCOUNTER — HOSPITAL ENCOUNTER (OUTPATIENT)
Age: 62
Discharge: HOME | End: 2022-10-21
Payer: COMMERCIAL

## 2022-10-21 LAB
24HR URINE VOLUME (ML): 1600 ML
CREAT SERPL-MCNC: 1.1 MG/DL (ref 0.7–1.2)
CREATININE 24 HOUR URINE: 1696 MG/24H (ref 980–2200)
CREATININE CLEARANCE: 107.1 ML/MIN (ref 65–123)
Lab: 24 HOURS
POTASSIUM 24 HOUR URINE: 106 MMOL/24H (ref 25–125)
PROTEIN 24 HOUR URINE: 1.87 G/24HR (ref 0–0.14)
SODIUM 24 HOUR URINE: 142 MMOL/24 HR (ref 40–220)

## 2022-10-21 PROCEDURE — 84300 ASSAY OF URINE SODIUM: CPT

## 2022-10-21 PROCEDURE — 84133 ASSAY OF URINE POTASSIUM: CPT

## 2022-10-21 PROCEDURE — 36415 COLL VENOUS BLD VENIPUNCTURE: CPT

## 2022-10-21 PROCEDURE — 82575 CREATININE CLEARANCE TEST: CPT

## 2022-10-21 PROCEDURE — 84156 ASSAY OF PROTEIN URINE: CPT

## 2022-11-15 DIAGNOSIS — N28.9 NEPHROPATHY: ICD-10-CM

## 2022-11-15 RX ORDER — 0.9 % SODIUM CHLORIDE 0.9 %
500 INTRAVENOUS SOLUTION INTRAVENOUS ONCE
Status: CANCELLED
Start: 2022-11-16 | End: 2022-11-16

## 2022-11-15 RX ORDER — HEPARIN SODIUM (PORCINE) LOCK FLUSH IV SOLN 100 UNIT/ML 100 UNIT/ML
500 SOLUTION INTRAVENOUS PRN
Status: CANCELLED | OUTPATIENT
Start: 2022-11-16

## 2022-11-15 RX ORDER — SODIUM CHLORIDE 9 MG/ML
5-250 INJECTION, SOLUTION INTRAVENOUS PRN
Status: CANCELLED | OUTPATIENT
Start: 2022-11-16

## 2022-11-15 RX ORDER — SODIUM CHLORIDE 0.9 % (FLUSH) 0.9 %
5-40 SYRINGE (ML) INJECTION PRN
Status: CANCELLED | OUTPATIENT
Start: 2022-11-16

## 2022-11-15 RX ORDER — 0.9 % SODIUM CHLORIDE 0.9 %
500 INTRAVENOUS SOLUTION INTRAVENOUS ONCE
Status: CANCELLED | OUTPATIENT
Start: 2022-11-16 | End: 2022-11-16

## 2022-11-16 ENCOUNTER — HOSPITAL ENCOUNTER (OUTPATIENT)
Dept: INFUSION THERAPY | Age: 62
Setting detail: INFUSION SERIES
Discharge: HOME | End: 2022-11-16
Payer: COMMERCIAL

## 2022-11-16 ENCOUNTER — HOSPITAL ENCOUNTER (OUTPATIENT)
Dept: CT IMAGING | Age: 62
Discharge: HOME | End: 2022-11-16
Payer: COMMERCIAL

## 2022-11-16 VITALS
DIASTOLIC BLOOD PRESSURE: 95 MMHG | OXYGEN SATURATION: 98 % | TEMPERATURE: 97.5 F | SYSTOLIC BLOOD PRESSURE: 178 MMHG | HEART RATE: 60 BPM | RESPIRATION RATE: 16 BRPM

## 2022-11-16 DIAGNOSIS — N28.9 NEPHROPATHY: ICD-10-CM

## 2022-11-16 DIAGNOSIS — N28.89 URETERAL FISTULA: ICD-10-CM

## 2022-11-16 DIAGNOSIS — I10 ESSENTIAL HYPERTENSION, MALIGNANT: ICD-10-CM

## 2022-11-16 DIAGNOSIS — I10 HYPERTENSION, UNSPECIFIED TYPE: Primary | ICD-10-CM

## 2022-11-16 PROCEDURE — 6360000004 HC RX CONTRAST MEDICATION: Performed by: RADIOLOGY

## 2022-11-16 PROCEDURE — 96366 THER/PROPH/DIAG IV INF ADDON: CPT

## 2022-11-16 PROCEDURE — 96360 HYDRATION IV INFUSION INIT: CPT

## 2022-11-16 PROCEDURE — 2580000003 HC RX 258: Performed by: INTERNAL MEDICINE

## 2022-11-16 PROCEDURE — 96361 HYDRATE IV INFUSION ADD-ON: CPT

## 2022-11-16 PROCEDURE — 74177 CT ABD & PELVIS W/CONTRAST: CPT

## 2022-11-16 RX ORDER — SODIUM CHLORIDE 0.9 % (FLUSH) 0.9 %
5-40 SYRINGE (ML) INJECTION PRN
Status: DISCONTINUED | OUTPATIENT
Start: 2022-11-16 | End: 2022-11-17 | Stop reason: HOSPADM

## 2022-11-16 RX ORDER — 0.9 % SODIUM CHLORIDE 0.9 %
500 INTRAVENOUS SOLUTION INTRAVENOUS ONCE
Status: CANCELLED | OUTPATIENT
Start: 2022-11-16 | End: 2022-11-16

## 2022-11-16 RX ORDER — HEPARIN SODIUM (PORCINE) LOCK FLUSH IV SOLN 100 UNIT/ML 100 UNIT/ML
500 SOLUTION INTRAVENOUS PRN
Status: CANCELLED | OUTPATIENT
Start: 2022-11-16

## 2022-11-16 RX ORDER — SODIUM CHLORIDE 0.9 % (FLUSH) 0.9 %
5-40 SYRINGE (ML) INJECTION PRN
Status: CANCELLED | OUTPATIENT
Start: 2022-11-16

## 2022-11-16 RX ORDER — SODIUM CHLORIDE 9 MG/ML
500 INJECTION, SOLUTION INTRAVENOUS ONCE
Status: COMPLETED | OUTPATIENT
Start: 2022-11-16 | End: 2022-11-16

## 2022-11-16 RX ORDER — 0.9 % SODIUM CHLORIDE 0.9 %
500 INTRAVENOUS SOLUTION INTRAVENOUS ONCE
Status: CANCELLED
Start: 2022-11-16 | End: 2022-11-16

## 2022-11-16 RX ORDER — SODIUM CHLORIDE 9 MG/ML
5-250 INJECTION, SOLUTION INTRAVENOUS PRN
Status: CANCELLED | OUTPATIENT
Start: 2022-11-16

## 2022-11-16 RX ADMIN — SODIUM CHLORIDE 500 ML: 9 INJECTION, SOLUTION INTRAVENOUS at 08:10

## 2022-11-16 RX ADMIN — SODIUM CHLORIDE, PRESERVATIVE FREE 10 ML: 5 INJECTION INTRAVENOUS at 08:05

## 2022-11-16 RX ADMIN — SODIUM CHLORIDE, PRESERVATIVE FREE 10 ML: 5 INJECTION INTRAVENOUS at 10:12

## 2022-11-16 RX ADMIN — SODIUM CHLORIDE, PRESERVATIVE FREE 10 ML: 5 INJECTION INTRAVENOUS at 12:50

## 2022-11-16 RX ADMIN — IOPAMIDOL 75 ML: 755 INJECTION, SOLUTION INTRAVENOUS at 10:54

## 2022-11-16 RX ADMIN — SODIUM CHLORIDE 500 ML: 9 INJECTION, SOLUTION INTRAVENOUS at 10:50

## 2022-11-16 RX ADMIN — SODIUM CHLORIDE, PRESERVATIVE FREE 10 ML: 5 INJECTION INTRAVENOUS at 10:50

## 2022-12-27 ENCOUNTER — HOSPITAL ENCOUNTER (OUTPATIENT)
Dept: ULTRASOUND IMAGING | Age: 62
Discharge: HOME | End: 2022-12-27
Payer: COMMERCIAL

## 2022-12-27 DIAGNOSIS — N18.2 CHRONIC KIDNEY DISEASE, STAGE II (MILD): ICD-10-CM

## 2022-12-27 PROCEDURE — 76770 US EXAM ABDO BACK WALL COMP: CPT

## 2023-01-18 ENCOUNTER — HOSPITAL ENCOUNTER (OUTPATIENT)
Age: 63
Discharge: HOME | End: 2023-01-18
Payer: COMMERCIAL

## 2023-01-18 LAB
ALBUMIN SERPL-MCNC: 4.3 G/DL (ref 3.5–5.2)
ALP BLD-CCNC: 101 U/L (ref 40–129)
ALT SERPL-CCNC: 10 U/L (ref 0–40)
ANION GAP SERPL CALCULATED.3IONS-SCNC: 11 MMOL/L (ref 7–16)
AST SERPL-CCNC: 17 U/L (ref 0–39)
BILIRUB SERPL-MCNC: 0.5 MG/DL (ref 0–1.2)
BUN BLDV-MCNC: 13 MG/DL (ref 6–23)
CALCIUM SERPL-MCNC: 9.6 MG/DL (ref 8.6–10.2)
CHLORIDE BLD-SCNC: 102 MMOL/L (ref 98–107)
CO2: 28 MMOL/L (ref 22–29)
CREAT SERPL-MCNC: 1.1 MG/DL (ref 0.7–1.2)
CREATININE URINE: 122 MG/DL (ref 40–278)
GFR SERPL CREATININE-BSD FRML MDRD: >60 ML/MIN/1.73
GLUCOSE BLD-MCNC: 132 MG/DL (ref 74–99)
HCT VFR BLD CALC: 44.5 % (ref 37–54)
HEMOGLOBIN: 14.7 G/DL (ref 12.5–16.5)
MAGNESIUM: 2.1 MG/DL (ref 1.6–2.6)
MCH RBC QN AUTO: 29.1 PG (ref 26–35)
MCHC RBC AUTO-ENTMCNC: 33 % (ref 32–34.5)
MCV RBC AUTO: 88.1 FL (ref 80–99.9)
MICROALBUMIN UR-MCNC: 84.4 MG/L
MICROALBUMIN/CREAT UR-RTO: 69.2 (ref 0–30)
PARATHYROID HORMONE INTACT: 46 PG/ML (ref 15–65)
PDW BLD-RTO: 13 FL (ref 11.5–15)
PHOSPHORUS: 3 MG/DL (ref 2.5–4.5)
PLATELET # BLD: 300 E9/L (ref 130–450)
PMV BLD AUTO: 9.9 FL (ref 7–12)
POTASSIUM SERPL-SCNC: 3.9 MMOL/L (ref 3.5–5)
PROTEIN PROTEIN: 27 MG/DL (ref 0–12)
PROTEIN/CREAT RATIO: 0.2
PROTEIN/CREAT RATIO: 0.2 (ref 0–0.2)
RBC # BLD: 5.05 E12/L (ref 3.8–5.8)
SODIUM BLD-SCNC: 141 MMOL/L (ref 132–146)
TOTAL PROTEIN: 7.7 G/DL (ref 6.4–8.3)
VITAMIN D 25-HYDROXY: 41 NG/ML (ref 30–100)
WBC # BLD: 4.8 E9/L (ref 4.5–11.5)

## 2023-01-18 PROCEDURE — 84165 PROTEIN E-PHORESIS SERUM: CPT

## 2023-01-18 PROCEDURE — 82570 ASSAY OF URINE CREATININE: CPT

## 2023-01-18 PROCEDURE — 84100 ASSAY OF PHOSPHORUS: CPT

## 2023-01-18 PROCEDURE — 83735 ASSAY OF MAGNESIUM: CPT

## 2023-01-18 PROCEDURE — 80053 COMPREHEN METABOLIC PANEL: CPT

## 2023-01-18 PROCEDURE — 85027 COMPLETE CBC AUTOMATED: CPT

## 2023-01-18 PROCEDURE — 82306 VITAMIN D 25 HYDROXY: CPT

## 2023-01-18 PROCEDURE — 82088 ASSAY OF ALDOSTERONE: CPT

## 2023-01-18 PROCEDURE — 86038 ANTINUCLEAR ANTIBODIES: CPT

## 2023-01-18 PROCEDURE — 82044 UR ALBUMIN SEMIQUANTITATIVE: CPT

## 2023-01-18 PROCEDURE — 36415 COLL VENOUS BLD VENIPUNCTURE: CPT

## 2023-01-18 PROCEDURE — 84244 ASSAY OF RENIN: CPT

## 2023-01-18 PROCEDURE — 84156 ASSAY OF PROTEIN URINE: CPT

## 2023-01-18 PROCEDURE — 83970 ASSAY OF PARATHORMONE: CPT

## 2023-01-19 LAB — ANTI-NUCLEAR ANTIBODY (ANA): NEGATIVE

## 2023-01-20 LAB
ALBUMIN SERPL-MCNC: 2.9 G/DL (ref 3.5–4.7)
ALPHA-1-GLOBULIN: 0.4 G/DL (ref 0.2–0.4)
ALPHA-2-GLOBULIN: 0.7 G/DL (ref 0.5–1)
BETA GLOBULIN: 1.5 G/DL (ref 0.8–1.3)
ELECTROPHORESIS: ABNORMAL
GAMMA GLOBULIN: 1.8 G/DL (ref 0.7–1.6)

## 2023-01-21 LAB — ALDOSTERONE: 48.4 NG/DL

## 2023-01-22 LAB — RENIN ACTIVITY: 1 NG/ML/HR
